# Patient Record
Sex: MALE | Race: WHITE | NOT HISPANIC OR LATINO | Employment: FULL TIME | RURAL
[De-identification: names, ages, dates, MRNs, and addresses within clinical notes are randomized per-mention and may not be internally consistent; named-entity substitution may affect disease eponyms.]

---

## 2021-06-24 ENCOUNTER — OFFICE VISIT (OUTPATIENT)
Dept: NEUROLOGY | Facility: CLINIC | Age: 26
End: 2021-06-24
Payer: COMMERCIAL

## 2021-06-24 VITALS
WEIGHT: 315 LBS | SYSTOLIC BLOOD PRESSURE: 114 MMHG | RESPIRATION RATE: 18 BRPM | HEIGHT: 74 IN | BODY MASS INDEX: 40.43 KG/M2 | OXYGEN SATURATION: 98 % | HEART RATE: 68 BPM | DIASTOLIC BLOOD PRESSURE: 78 MMHG

## 2021-06-24 DIAGNOSIS — F32.A DEPRESSION, UNSPECIFIED DEPRESSION TYPE: ICD-10-CM

## 2021-06-24 DIAGNOSIS — G40.309 GENERALIZED EPILEPSY: Primary | ICD-10-CM

## 2021-06-24 PROCEDURE — 99214 OFFICE O/P EST MOD 30 MIN: CPT | Mod: PBBFAC | Performed by: NURSE PRACTITIONER

## 2021-06-24 PROCEDURE — 3008F PR BODY MASS INDEX (BMI) DOCUMENTED: ICD-10-PCS | Mod: ,,, | Performed by: NURSE PRACTITIONER

## 2021-06-24 PROCEDURE — 99213 PR OFFICE/OUTPT VISIT, EST, LEVL III, 20-29 MIN: ICD-10-PCS | Mod: S$PBB,,, | Performed by: NURSE PRACTITIONER

## 2021-06-24 PROCEDURE — 99213 OFFICE O/P EST LOW 20 MIN: CPT | Mod: S$PBB,,, | Performed by: NURSE PRACTITIONER

## 2021-06-24 PROCEDURE — 3008F BODY MASS INDEX DOCD: CPT | Mod: ,,, | Performed by: NURSE PRACTITIONER

## 2021-06-24 RX ORDER — CITALOPRAM 20 MG/1
20 TABLET, FILM COATED ORAL DAILY
Qty: 30 TABLET | Refills: 11 | Status: SHIPPED | OUTPATIENT
Start: 2021-06-24 | End: 2022-03-08 | Stop reason: SDUPTHER

## 2021-06-24 RX ORDER — TOPIRAMATE 200 MG/1
2 CAPSULE, EXTENDED RELEASE ORAL NIGHTLY
Qty: 30 CAPSULE | Refills: 11 | Status: SHIPPED | OUTPATIENT
Start: 2021-06-24 | End: 2021-09-28

## 2021-09-14 ENCOUNTER — OFFICE VISIT (OUTPATIENT)
Dept: FAMILY MEDICINE | Facility: CLINIC | Age: 26
End: 2021-09-14
Payer: COMMERCIAL

## 2021-09-14 DIAGNOSIS — K52.9 GASTROENTERITIS: ICD-10-CM

## 2021-09-14 DIAGNOSIS — Z11.59 SCREENING FOR VIRAL DISEASE: ICD-10-CM

## 2021-09-14 DIAGNOSIS — G40.909 SEIZURE DISORDER: Chronic | ICD-10-CM

## 2021-09-14 DIAGNOSIS — Z20.822 CLOSE EXPOSURE TO COVID-19 VIRUS: Primary | ICD-10-CM

## 2021-09-14 DIAGNOSIS — J06.9 UPPER RESPIRATORY TRACT INFECTION, UNSPECIFIED TYPE: ICD-10-CM

## 2021-09-14 LAB
CTP QC/QA: YES
FLUAV AG NPH QL: NEGATIVE
FLUBV AG NPH QL: NEGATIVE
SARS-COV-2 AG RESP QL IA.RAPID: NEGATIVE

## 2021-09-14 PROCEDURE — 1159F MED LIST DOCD IN RCRD: CPT | Mod: ,,, | Performed by: FAMILY MEDICINE

## 2021-09-14 PROCEDURE — 87428 SARSCOV & INF VIR A&B AG IA: CPT | Mod: QW,,, | Performed by: FAMILY MEDICINE

## 2021-09-14 PROCEDURE — 99213 PR OFFICE/OUTPT VISIT, EST, LEVL III, 20-29 MIN: ICD-10-PCS | Mod: ,,, | Performed by: FAMILY MEDICINE

## 2021-09-14 PROCEDURE — 87428 POCT SARS-COV2 (COVID) WITH FLU ANTIGEN: ICD-10-PCS | Mod: QW,,, | Performed by: FAMILY MEDICINE

## 2021-09-14 PROCEDURE — 1160F RVW MEDS BY RX/DR IN RCRD: CPT | Mod: ,,, | Performed by: FAMILY MEDICINE

## 2021-09-14 PROCEDURE — 1160F PR REVIEW ALL MEDS BY PRESCRIBER/CLIN PHARMACIST DOCUMENTED: ICD-10-PCS | Mod: ,,, | Performed by: FAMILY MEDICINE

## 2021-09-14 PROCEDURE — 99213 OFFICE O/P EST LOW 20 MIN: CPT | Mod: ,,, | Performed by: FAMILY MEDICINE

## 2021-09-14 PROCEDURE — 1159F PR MEDICATION LIST DOCUMENTED IN MEDICAL RECORD: ICD-10-PCS | Mod: ,,, | Performed by: FAMILY MEDICINE

## 2021-09-15 ENCOUNTER — OFFICE VISIT (OUTPATIENT)
Dept: FAMILY MEDICINE | Facility: CLINIC | Age: 26
End: 2021-09-15
Payer: COMMERCIAL

## 2021-09-15 DIAGNOSIS — R53.81 MALAISE: ICD-10-CM

## 2021-09-15 DIAGNOSIS — Z20.822 CLOSE EXPOSURE TO COVID-19 VIRUS: Primary | ICD-10-CM

## 2021-09-15 DIAGNOSIS — E66.9 OBESITY, UNSPECIFIED CLASSIFICATION, UNSPECIFIED OBESITY TYPE, UNSPECIFIED WHETHER SERIOUS COMORBIDITY PRESENT: Chronic | ICD-10-CM

## 2021-09-15 DIAGNOSIS — Z11.52 ENCOUNTER FOR SCREENING FOR COVID-19: ICD-10-CM

## 2021-09-15 DIAGNOSIS — G40.909 SEIZURE DISORDER: Chronic | ICD-10-CM

## 2021-09-15 PROCEDURE — 99213 PR OFFICE/OUTPT VISIT, EST, LEVL III, 20-29 MIN: ICD-10-PCS | Mod: ,,, | Performed by: FAMILY MEDICINE

## 2021-09-15 PROCEDURE — 1159F MED LIST DOCD IN RCRD: CPT | Mod: ,,, | Performed by: FAMILY MEDICINE

## 2021-09-15 PROCEDURE — 1160F PR REVIEW ALL MEDS BY PRESCRIBER/CLIN PHARMACIST DOCUMENTED: ICD-10-PCS | Mod: ,,, | Performed by: FAMILY MEDICINE

## 2021-09-15 PROCEDURE — 87635 SARS-COV-2 COVID-19 AMP PRB: CPT | Mod: ,,, | Performed by: CLINICAL MEDICAL LABORATORY

## 2021-09-15 PROCEDURE — 1159F PR MEDICATION LIST DOCUMENTED IN MEDICAL RECORD: ICD-10-PCS | Mod: ,,, | Performed by: FAMILY MEDICINE

## 2021-09-15 PROCEDURE — 1160F RVW MEDS BY RX/DR IN RCRD: CPT | Mod: ,,, | Performed by: FAMILY MEDICINE

## 2021-09-15 PROCEDURE — 87635 SARS-COV-2 (COVID-19) QUALITATIVE PCR: ICD-10-PCS | Mod: ,,, | Performed by: CLINICAL MEDICAL LABORATORY

## 2021-09-15 PROCEDURE — 99213 OFFICE O/P EST LOW 20 MIN: CPT | Mod: ,,, | Performed by: FAMILY MEDICINE

## 2021-09-16 ENCOUNTER — TELEPHONE (OUTPATIENT)
Dept: NEUROLOGY | Facility: CLINIC | Age: 26
End: 2021-09-16

## 2021-09-17 LAB — SARS-COV-2 RNA RESP QL NAA+PROBE: NEGATIVE

## 2021-09-28 ENCOUNTER — TELEPHONE (OUTPATIENT)
Dept: NEUROLOGY | Facility: CLINIC | Age: 26
End: 2021-09-28

## 2021-09-28 DIAGNOSIS — G40.309 GENERALIZED EPILEPSY: Primary | ICD-10-CM

## 2021-09-28 RX ORDER — TOPIRAMATE 200 MG/1
200 TABLET ORAL 2 TIMES DAILY
Qty: 60 TABLET | Refills: 11 | Status: SHIPPED | OUTPATIENT
Start: 2021-09-28 | End: 2022-03-08 | Stop reason: SDUPTHER

## 2021-12-20 ENCOUNTER — OFFICE VISIT (OUTPATIENT)
Dept: NEUROLOGY | Facility: CLINIC | Age: 26
End: 2021-12-20
Payer: COMMERCIAL

## 2021-12-20 VITALS
OXYGEN SATURATION: 98 % | WEIGHT: 315 LBS | RESPIRATION RATE: 18 BRPM | HEIGHT: 74 IN | HEART RATE: 85 BPM | SYSTOLIC BLOOD PRESSURE: 112 MMHG | DIASTOLIC BLOOD PRESSURE: 70 MMHG | BODY MASS INDEX: 40.43 KG/M2

## 2021-12-20 DIAGNOSIS — G40.309 GENERALIZED EPILEPSY: Primary | ICD-10-CM

## 2021-12-20 DIAGNOSIS — F32.A DEPRESSION, UNSPECIFIED DEPRESSION TYPE: ICD-10-CM

## 2021-12-20 PROCEDURE — 99213 PR OFFICE/OUTPT VISIT, EST, LEVL III, 20-29 MIN: ICD-10-PCS | Mod: S$PBB,,, | Performed by: NURSE PRACTITIONER

## 2021-12-20 PROCEDURE — 99213 OFFICE O/P EST LOW 20 MIN: CPT | Mod: S$PBB,,, | Performed by: NURSE PRACTITIONER

## 2021-12-20 PROCEDURE — 99214 OFFICE O/P EST MOD 30 MIN: CPT | Mod: PBBFAC | Performed by: NURSE PRACTITIONER

## 2022-03-08 DIAGNOSIS — G40.309 GENERALIZED EPILEPSY: ICD-10-CM

## 2022-03-08 DIAGNOSIS — F32.A DEPRESSION, UNSPECIFIED DEPRESSION TYPE: ICD-10-CM

## 2022-03-08 RX ORDER — CITALOPRAM 20 MG/1
20 TABLET, FILM COATED ORAL DAILY
Qty: 90 TABLET | Refills: 3 | Status: SHIPPED | OUTPATIENT
Start: 2022-03-08 | End: 2022-04-12 | Stop reason: SDUPTHER

## 2022-03-08 RX ORDER — TOPIRAMATE 200 MG/1
200 TABLET ORAL 2 TIMES DAILY
Qty: 180 TABLET | Refills: 3 | Status: SHIPPED | OUTPATIENT
Start: 2022-03-08 | End: 2022-04-12 | Stop reason: SDUPTHER

## 2022-03-08 RX ORDER — ERGOCALCIFEROL 1.25 MG/1
50000 CAPSULE ORAL
Qty: 3 CAPSULE | Refills: 3 | Status: SHIPPED | OUTPATIENT
Start: 2022-03-08 | End: 2022-04-12 | Stop reason: SDUPTHER

## 2022-03-18 ENCOUNTER — PATIENT MESSAGE (OUTPATIENT)
Dept: NEUROLOGY | Facility: CLINIC | Age: 27
End: 2022-03-18
Payer: COMMERCIAL

## 2022-04-07 DIAGNOSIS — G47.33 OSA (OBSTRUCTIVE SLEEP APNEA): Primary | ICD-10-CM

## 2022-04-12 ENCOUNTER — PATIENT MESSAGE (OUTPATIENT)
Dept: NEUROLOGY | Facility: CLINIC | Age: 27
End: 2022-04-12
Payer: COMMERCIAL

## 2022-04-12 DIAGNOSIS — G40.309 GENERALIZED EPILEPSY: ICD-10-CM

## 2022-04-12 DIAGNOSIS — F32.A DEPRESSION, UNSPECIFIED DEPRESSION TYPE: ICD-10-CM

## 2022-04-12 RX ORDER — TOPIRAMATE 200 MG/1
200 TABLET ORAL 2 TIMES DAILY
Qty: 180 TABLET | Refills: 3 | Status: SHIPPED | OUTPATIENT
Start: 2022-04-12 | End: 2022-09-12 | Stop reason: CLARIF

## 2022-04-12 RX ORDER — CITALOPRAM 20 MG/1
20 TABLET, FILM COATED ORAL DAILY
Qty: 90 TABLET | Refills: 3 | Status: SHIPPED | OUTPATIENT
Start: 2022-04-12 | End: 2023-02-02 | Stop reason: SDUPTHER

## 2022-04-12 RX ORDER — ERGOCALCIFEROL 1.25 MG/1
50000 CAPSULE ORAL
Qty: 3 CAPSULE | Refills: 3 | Status: SHIPPED | OUTPATIENT
Start: 2022-04-12

## 2022-04-28 ENCOUNTER — OFFICE VISIT (OUTPATIENT)
Dept: FAMILY MEDICINE | Facility: CLINIC | Age: 27
End: 2022-04-28
Payer: COMMERCIAL

## 2022-04-28 VITALS
HEART RATE: 78 BPM | HEIGHT: 74 IN | SYSTOLIC BLOOD PRESSURE: 125 MMHG | DIASTOLIC BLOOD PRESSURE: 72 MMHG | TEMPERATURE: 97 F | BODY MASS INDEX: 40.43 KG/M2 | WEIGHT: 315 LBS

## 2022-04-28 DIAGNOSIS — R05.3 CHRONIC COUGH: Primary | Chronic | ICD-10-CM

## 2022-04-28 DIAGNOSIS — R21 RASH AND NONSPECIFIC SKIN ERUPTION: Chronic | ICD-10-CM

## 2022-04-28 DIAGNOSIS — J30.1 SEASONAL ALLERGIC RHINITIS DUE TO POLLEN: Chronic | ICD-10-CM

## 2022-04-28 DIAGNOSIS — G47.33 OSA ON CPAP: Chronic | ICD-10-CM

## 2022-04-28 DIAGNOSIS — Z87.09 HISTORY OF ASTHMA: ICD-10-CM

## 2022-04-28 PROCEDURE — 99214 PR OFFICE/OUTPT VISIT, EST, LEVL IV, 30-39 MIN: ICD-10-PCS | Mod: ,,, | Performed by: FAMILY MEDICINE

## 2022-04-28 PROCEDURE — 99214 OFFICE O/P EST MOD 30 MIN: CPT | Mod: ,,, | Performed by: FAMILY MEDICINE

## 2022-04-28 RX ORDER — MONTELUKAST SODIUM 10 MG/1
10 TABLET ORAL NIGHTLY
Qty: 30 TABLET | Refills: 5 | Status: SHIPPED | OUTPATIENT
Start: 2022-04-28 | End: 2022-07-26 | Stop reason: SDUPTHER

## 2022-04-28 RX ORDER — KETOCONAZOLE 20 MG/ML
SHAMPOO, SUSPENSION TOPICAL
Qty: 120 ML | Refills: 1 | Status: SHIPPED | OUTPATIENT
Start: 2022-04-28 | End: 2022-05-05

## 2022-04-28 RX ORDER — TOPIRAMATE 100 MG/1
CAPSULE, EXTENDED RELEASE ORAL
COMMUNITY
End: 2022-05-05

## 2022-04-28 NOTE — PROGRESS NOTES
Jerad Gill MD   [facility]  MEDICAL GROUP [address] [place] [address]  [phone]      PATIENT NAME: Rito Bowens  : 1995  DATE: 22  MRN: [id number]      Billing Provider: Jerad Gill MD  Level of Service:   Patient PCP Information       Provider PCP Type    Jerad Gill MD General            Reason for Visit / Chief Complaint: check up, Cough, and Fatigue (Sores on legs)       Update PCP  Update Chief Complaint         History of Present Illness / Problem Focused Workflow     Rito Bowens presents to the clinic with check up, Cough, and Fatigue (Sores on legs)       27 yo WM who reports chronic cough x almost 10 years.  Has CHRISTINA and is on CPAP.  Says that cough started after he began using CPAP in .  Does report having allergic rhinitis and occasional GERD.  Has h/o childhood asthma.  He reports that cough occurs almost daily and has been worse over the past few weeks. Is non-productive.   Saw sleep clinic 2 weeks ago and told that his CPAP machine has a recall due to problems with filter.  Per pt and his mother, he was told to get a CXR.    Also c/o rash to lower legs present x several years after he had scabies.  Denies any rash on any other areas of body.  It does itch and is not painful.        Review of Systems     Review of Systems   Constitutional: Positive for fatigue. Negative for chills and fever.   HENT: Positive for rhinorrhea and sinus pressure/congestion.    Respiratory: Positive for apnea, cough and wheezing. Negative for shortness of breath.    Integumentary:  Positive for rash.   Neurological: Positive for seizures (last seizure 2019). Negative for syncope.   Psychiatric/Behavioral: Positive for sleep disturbance (on CPAP for CHRISTINA).        Medical / Social / Family History     Past Medical History:   Diagnosis Date    Attention deficit hyperactivity disorder     Epilepsy,  unspecified, intractable, without status epilepticus     Headache     Juvenile myoclonic epilepsy        History reviewed. No pertinent surgical history.    Social History    reports that he has never smoked. He has never used smokeless tobacco. He reports that he does not drink alcohol and does not use drugs.   Social History     Tobacco Use    Smoking status: Never Smoker    Smokeless tobacco: Never Used   Substance Use Topics    Alcohol use: Never    Drug use: Never       Family History  Family History   Problem Relation Age of Onset    Heart disease Other     Hypertension Other     Diabetes Other     Cancer Other         type unknown       Medications and Allergies     Medications  Outpatient Medications Marked as Taking for the 4/28/22 encounter (Office Visit) with Jerad Gill MD   Medication Sig Dispense Refill    citalopram (CELEXA) 20 MG tablet Take 1 tablet (20 mg total) by mouth once daily. 90 tablet 3    ergocalciferol (ERGOCALCIFEROL) 50,000 unit Cap Take 1 capsule (50,000 Units total) by mouth every 30 days. 3 capsule 3    topiramate (TOPAMAX) 200 MG Tab Take 1 tablet (200 mg total) by mouth 2 (two) times daily. 180 tablet 3       Allergies  Review of patient's allergies indicates:  No Known Allergies    Physical Examination     Vitals:    04/28/22 1014   BP: 125/72   Pulse: 78   Temp: 96.9 °F (36.1 °C)     Physical Exam  Vitals reviewed.   Constitutional:       General: He is not in acute distress.     Appearance: Normal appearance. He is obese. He is not ill-appearing or toxic-appearing.   HENT:      Head: Normocephalic and atraumatic.   Eyes:      Extraocular Movements: Extraocular movements intact.      Conjunctiva/sclera: Conjunctivae normal.      Pupils: Pupils are equal, round, and reactive to light.   Cardiovascular:      Rate and Rhythm: Normal rate and regular rhythm.      Heart sounds: Normal heart sounds.   Pulmonary:      Effort: Pulmonary effort is normal. No respiratory  distress.      Breath sounds: Normal breath sounds. No stridor. No wheezing, rhonchi or rales.   Musculoskeletal:         General: Normal range of motion.      Cervical back: Normal range of motion and neck supple.   Skin:     General: Skin is warm and dry.      Findings: Rash (follicular rash to lower legs (non-pustular)) present.   Neurological:      General: No focal deficit present.      Mental Status: He is alert and oriented to person, place, and time.   Psychiatric:         Mood and Affect: Mood normal.         Behavior: Behavior normal.      X-Ray Chest PA And Lateral  Narrative: EXAMINATION:  XR CHEST PA AND LATERAL    CLINICAL HISTORY:  Chronic cough    COMPARISON:  Chest x-ray December 22, 2012    TECHNIQUE:  Frontal and lateral views of the chest.    FINDINGS:  The cardiomediastinal silhouette is stable in configuration.  Chronic change of the lungs without focal consolidation, pleural effusion, or pneumothorax.  Visualized osseous and surrounding soft tissue structures appear grossly unchanged.  Impression: No acute cardiopulmonary process demonstrated.    Point of Service: [facility]    Electronically signed by: Manuel Fuentes  Date:    04/28/2022  Time:    11:42        Assessment and Plan (including Health Maintenance)      Problem List  Smart Sets  Document Outside HM   :    Plan: meds as prescribed        Health Maintenance Due   Topic Date Due    Hepatitis C Screening  Never done    Lipid Panel  Never done    COVID-19 Vaccine (1) Never done    HPV Vaccines (1 - Male 2-dose series) Never done    HIV Screening  Never done    TETANUS VACCINE  Never done    Influenza Vaccine (1) Never done       Problem List Items Addressed This Visit        ENT    Seasonal allergic rhinitis due to pollen    Relevant Medications    montelukast (SINGULAIR) 10 mg tablet       Derm    Rash and nonspecific skin eruption    Relevant Medications    ketoconazole (NIZORAL) 2 % shampoo       Pulmonary     Chronic cough - Primary (Chronic)    Relevant Medications    montelukast (SINGULAIR) 10 mg tablet    Other Relevant Orders    X-Ray Chest PA And Lateral (Completed)       Other    CHRISTINA on CPAP      Other Visit Diagnoses     History of asthma        Relevant Medications    montelukast (SINGULAIR) 10 mg tablet          The patient has no Health Maintenance topics of status Not Due    Future Appointments   Date Time Provider Department Center   5/5/2022  9:15 AM Jerad Gill MD [facility]   6/15/2022  7:00 PM SLEEP LAB, [facility]EP [facility]   6/29/2022  2:00 PM VANESSA Herrera [facility]            Signature:  Jerad Gill MD  [facility]  MEDICAL GROUP [facility] - FAMILY MEDICINE  [address] [place] [address] [place] [address]  [phone]    Date of encounter: 4/28/22

## 2022-05-05 ENCOUNTER — OFFICE VISIT (OUTPATIENT)
Dept: FAMILY MEDICINE | Facility: CLINIC | Age: 27
End: 2022-05-05
Payer: COMMERCIAL

## 2022-05-05 VITALS
SYSTOLIC BLOOD PRESSURE: 116 MMHG | HEIGHT: 74 IN | BODY MASS INDEX: 40.43 KG/M2 | DIASTOLIC BLOOD PRESSURE: 78 MMHG | WEIGHT: 315 LBS | HEART RATE: 72 BPM

## 2022-05-05 DIAGNOSIS — Z13.0 SCREENING FOR IRON DEFICIENCY ANEMIA: ICD-10-CM

## 2022-05-05 DIAGNOSIS — Z00.00 ENCOUNTER FOR ROUTINE HISTORY AND PHYSICAL EXAMINATION OF ADULT: Primary | ICD-10-CM

## 2022-05-05 DIAGNOSIS — Z13.220 SCREENING FOR LIPOID DISORDERS: ICD-10-CM

## 2022-05-05 LAB
ANION GAP SERPL CALCULATED.3IONS-SCNC: 11 MMOL/L (ref 7–16)
ATYPICAL LYMPHOCYTES: ABNORMAL
BASOPHILS # BLD AUTO: 0.07 K/UL (ref 0–0.2)
BASOPHILS NFR BLD AUTO: 0.6 % (ref 0–1)
BUN SERPL-MCNC: 15 MG/DL (ref 7–18)
BUN/CREAT SERPL: 13 (ref 6–20)
CALCIUM SERPL-MCNC: 8.9 MG/DL (ref 8.5–10.1)
CHLORIDE SERPL-SCNC: 105 MMOL/L (ref 98–107)
CHOLEST SERPL-MCNC: 216 MG/DL (ref 0–200)
CHOLEST/HDLC SERPL: 7.2 {RATIO}
CO2 SERPL-SCNC: 25 MMOL/L (ref 21–32)
CREAT SERPL-MCNC: 1.18 MG/DL (ref 0.7–1.3)
DIFFERENTIAL METHOD BLD: ABNORMAL
EOSINOPHIL # BLD AUTO: 0.68 K/UL (ref 0–0.5)
EOSINOPHIL NFR BLD AUTO: 5.5 % (ref 1–4)
EOSINOPHIL NFR BLD MANUAL: 6 % (ref 1–4)
ERYTHROCYTE [DISTWIDTH] IN BLOOD BY AUTOMATED COUNT: 13.5 % (ref 11.5–14.5)
GLUCOSE SERPL-MCNC: 75 MG/DL (ref 74–106)
HCT VFR BLD AUTO: 46.9 % (ref 40–54)
HDLC SERPL-MCNC: 30 MG/DL (ref 40–60)
HGB BLD-MCNC: 15.7 G/DL (ref 13.5–18)
IMM GRANULOCYTES # BLD AUTO: 0.05 K/UL (ref 0–0.04)
IMM GRANULOCYTES NFR BLD: 0.4 % (ref 0–0.4)
LDLC SERPL CALC-MCNC: 146 MG/DL
LDLC/HDLC SERPL: 4.9 {RATIO}
LYMPHOCYTES # BLD AUTO: 4.98 K/UL (ref 1–4.8)
LYMPHOCYTES NFR BLD AUTO: 40 % (ref 27–41)
LYMPHOCYTES NFR BLD MANUAL: 43 % (ref 27–41)
MCH RBC QN AUTO: 27.4 PG (ref 27–31)
MCHC RBC AUTO-ENTMCNC: 33.5 G/DL (ref 32–36)
MCV RBC AUTO: 81.8 FL (ref 80–96)
MONOCYTES # BLD AUTO: 0.71 K/UL (ref 0–0.8)
MONOCYTES NFR BLD AUTO: 5.7 % (ref 2–6)
MONOCYTES NFR BLD MANUAL: 7 % (ref 2–6)
MPC BLD CALC-MCNC: 10.6 FL (ref 9.4–12.4)
NEUTROPHILS # BLD AUTO: 5.96 K/UL (ref 1.8–7.7)
NEUTROPHILS NFR BLD AUTO: 47.8 % (ref 53–65)
NEUTS SEG NFR BLD MANUAL: 44 % (ref 50–62)
NONHDLC SERPL-MCNC: 186 MG/DL
NRBC # BLD AUTO: 0 X10E3/UL
NRBC, AUTO (.00): 0 %
PLATELET # BLD AUTO: 329 K/UL (ref 150–400)
PLATELET MORPHOLOGY: NORMAL
POTASSIUM SERPL-SCNC: 4.3 MMOL/L (ref 3.5–5.1)
RBC # BLD AUTO: 5.73 M/UL (ref 4.6–6.2)
RBC MORPH BLD: NORMAL
SODIUM SERPL-SCNC: 137 MMOL/L (ref 136–145)
TRIGL SERPL-MCNC: 199 MG/DL (ref 35–150)
VLDLC SERPL-MCNC: 40 MG/DL
WBC # BLD AUTO: 12.45 K/UL (ref 4.5–11)

## 2022-05-05 PROCEDURE — 99395 PR PREVENTIVE VISIT,EST,18-39: ICD-10-PCS | Mod: ,,, | Performed by: FAMILY MEDICINE

## 2022-05-05 PROCEDURE — 80061 LIPID PANEL: CPT | Mod: ,,, | Performed by: CLINICAL MEDICAL LABORATORY

## 2022-05-05 PROCEDURE — 85025 COMPLETE CBC W/AUTO DIFF WBC: CPT | Mod: ,,, | Performed by: CLINICAL MEDICAL LABORATORY

## 2022-05-05 PROCEDURE — 80061 LIPID PANEL: ICD-10-PCS | Mod: ,,, | Performed by: CLINICAL MEDICAL LABORATORY

## 2022-05-05 PROCEDURE — 99395 PREV VISIT EST AGE 18-39: CPT | Mod: ,,, | Performed by: FAMILY MEDICINE

## 2022-05-05 PROCEDURE — 80048 BASIC METABOLIC PANEL: ICD-10-PCS | Mod: ,,, | Performed by: CLINICAL MEDICAL LABORATORY

## 2022-05-05 PROCEDURE — 85025 CBC WITH DIFFERENTIAL: ICD-10-PCS | Mod: ,,, | Performed by: CLINICAL MEDICAL LABORATORY

## 2022-05-05 PROCEDURE — 80048 BASIC METABOLIC PNL TOTAL CA: CPT | Mod: ,,, | Performed by: CLINICAL MEDICAL LABORATORY

## 2022-05-05 NOTE — PROGRESS NOTES
Jerad Gill MD   [facility]  MEDICAL GROUP [address] [address]  [phone]      PATIENT NAME: Riot Bowens  : 1995  DATE: 22  MRN: [id number]      Billing Provider: Jerad Gill MD  Level of Service: NE PREVENTIVE VISIT,EST,18-39  Patient PCP Information       Provider PCP Type    Jerad Gill MD General            Reason for Visit / Chief Complaint: Annual Exam       Update PCP  Update Chief Complaint         History of Present Illness / Problem Focused Workflow     Rito Bowens presents to the clinic with Annual Exam       27 yo WM who presents for wellness visit.  He does not remember the last time that he had labs done.  PMH is significant childhood asthma and seizure disorder.   Last seizure was in 2019.  He also has CHRISTINA and is on CPAP. He does not smoke but does use smokeless tobacco.  He wears glasses and states that he is due for an eye exam.  He is overdue for a dental exam.       Review of Systems     Review of Systems   Constitutional: Negative for chills, fatigue and fever.   HENT: Negative for sore throat and trouble swallowing.    Respiratory: Negative for shortness of breath and wheezing.    Cardiovascular: Negative for chest pain, palpitations and leg swelling.   Gastrointestinal: Negative for abdominal pain, change in bowel habit, nausea, vomiting and change in bowel habit.   Musculoskeletal: Negative for arthralgias, back pain, joint swelling and myalgias.   Integumentary:  Negative for rash and mole/lesion.   Neurological: Negative for dizziness, syncope, weakness, light-headedness, numbness and headaches.   Psychiatric/Behavioral: Negative for confusion.        Medical / Social / Family History     Past Medical History:   Diagnosis Date    Attention deficit hyperactivity disorder     Epilepsy, unspecified, intractable, without status epilepticus     Headache     Juvenile  myoclonic epilepsy        History reviewed. No pertinent surgical history.    Social History    reports that he has never smoked. He has never used smokeless tobacco. He reports that he does not drink alcohol and does not use drugs.   Social History     Tobacco Use    Smoking status: Never Smoker    Smokeless tobacco: Never Used   Substance Use Topics    Alcohol use: Never    Drug use: Never       Family History  Family History   Problem Relation Age of Onset    Heart disease Other     Hypertension Other     Diabetes Other     Cancer Other         type unknown       Medications and Allergies     Medications  Outpatient Medications Marked as Taking for the 5/5/22 encounter (Office Visit) with Jerad Gill MD   Medication Sig Dispense Refill    citalopram (CELEXA) 20 MG tablet Take 1 tablet (20 mg total) by mouth once daily. 90 tablet 3    ergocalciferol (ERGOCALCIFEROL) 50,000 unit Cap Take 1 capsule (50,000 Units total) by mouth every 30 days. 3 capsule 3    montelukast (SINGULAIR) 10 mg tablet Take 1 tablet (10 mg total) by mouth every evening. 30 tablet 5    topiramate (TOPAMAX) 200 MG Tab Take 1 tablet (200 mg total) by mouth 2 (two) times daily. 180 tablet 3       Allergies  Review of patient's allergies indicates:  No Known Allergies    Physical Examination     Vitals:    05/05/22 0930   BP: 116/78   Pulse: 72     Physical Exam  Vitals reviewed.   Constitutional:       Appearance: Normal appearance.   HENT:      Head: Normocephalic and atraumatic.   Eyes:      Extraocular Movements: Extraocular movements intact.      Conjunctiva/sclera: Conjunctivae normal.      Pupils: Pupils are equal, round, and reactive to light.   Cardiovascular:      Rate and Rhythm: Normal rate and regular rhythm.      Heart sounds: Normal heart sounds.   Pulmonary:      Effort: Pulmonary effort is normal.      Breath sounds: Normal breath sounds.   Musculoskeletal:         General: Normal range of motion.      Cervical  back: Normal range of motion and neck supple.   Skin:     General: Skin is warm and dry.   Neurological:      General: No focal deficit present.      Mental Status: He is alert and oriented to person, place, and time.   Psychiatric:         Mood and Affect: Mood normal.         Behavior: Behavior normal.          Assessment and Plan (including Health Maintenance)      Problem List  Smart Sets  Document Outside HM   :    Plan: recommend getting eye exam and dental exam.    Advised to stop oral tobacco and discussed risks of use, including oral cancer.    Recommend healthy diet low in sodium, saturated/trans fats, refined carbs and processed foods.    Recommend regular exercise.    Stay UTD on preventive screenings.  Pt has patient protal account to review lab results.  Advised to call clinic for any abnormal results.          Health Maintenance Due   Topic Date Due    Hepatitis C Screening  Never done    Lipid Panel  Never done    COVID-19 Vaccine (1) Never done    HPV Vaccines (1 - Male 2-dose series) Never done    HIV Screening  Never done    TETANUS VACCINE  Never done       Problem List Items Addressed This Visit    None     Visit Diagnoses     Encounter for routine history and physical examination of adult    -  Primary    Relevant Orders    Basic Metabolic Panel    Screening for iron deficiency anemia        Relevant Orders    CBC Auto Differential    Screening for lipoid disorders        Relevant Orders    Lipid Panel          Health Maintenance Topics with due status: Not Due       Topic Last Completion Date    Influenza Vaccine Not Due       Future Appointments   Date Time Provider Department Center   6/15/2022  7:00 PM SLEEP LAB, [facility]   6/29/2022  2:00 PM VANESSA Herrera [facility]            Signature:  MD [facility]  MEDICAL GROUP [facility] - FAMILY MEDICINE  [address] [place] [address] [place]  [address]  [phone]    Date of encounter: 5/5/22

## 2022-06-01 ENCOUNTER — HOSPITAL ENCOUNTER (EMERGENCY)
Facility: HOSPITAL | Age: 27
Discharge: HOME | End: 2022-06-01
Payer: COMMERCIAL

## 2022-06-01 VITALS
SYSTOLIC BLOOD PRESSURE: 119 MMHG | WEIGHT: 315 LBS | OXYGEN SATURATION: 99 % | RESPIRATION RATE: 16 BRPM | HEART RATE: 88 BPM | TEMPERATURE: 98 F | DIASTOLIC BLOOD PRESSURE: 62 MMHG | BODY MASS INDEX: 39.17 KG/M2 | HEIGHT: 75 IN

## 2022-06-01 DIAGNOSIS — N20.0 KIDNEY STONE: Primary | ICD-10-CM

## 2022-06-01 LAB
ALBUMIN SERPL BCP-MCNC: 4.4 G/DL (ref 3.5–5)
ALBUMIN/GLOB SERPL: 1 {RATIO}
ALP SERPL-CCNC: 118 U/L (ref 45–115)
ALT SERPL W P-5'-P-CCNC: 74 U/L (ref 16–61)
AMORPH PHOS CRY #/AREA URNS LPF: ABNORMAL /LPF
ANION GAP SERPL CALCULATED.3IONS-SCNC: 15 MMOL/L (ref 7–16)
AST SERPL W P-5'-P-CCNC: 30 U/L (ref 15–37)
BASOPHILS # BLD AUTO: 0.04 K/UL (ref 0–0.2)
BASOPHILS NFR BLD AUTO: 0.3 % (ref 0–1)
BILIRUB SERPL-MCNC: 0.4 MG/DL (ref 0–1.2)
BILIRUB UR QL STRIP: ABNORMAL
BUN SERPL-MCNC: 18 MG/DL (ref 7–18)
BUN/CREAT SERPL: 13 (ref 6–20)
CALCIUM SERPL-MCNC: 9 MG/DL (ref 8.5–10.1)
CHLORIDE SERPL-SCNC: 103 MMOL/L (ref 98–107)
CLARITY UR: ABNORMAL
CO2 SERPL-SCNC: 22 MMOL/L (ref 21–32)
COLOR UR: YELLOW
CREAT SERPL-MCNC: 1.37 MG/DL (ref 0.7–1.3)
DIFFERENTIAL METHOD BLD: ABNORMAL
EOSINOPHIL # BLD AUTO: 0.12 K/UL (ref 0–0.5)
EOSINOPHIL NFR BLD AUTO: 0.9 % (ref 1–4)
EOSINOPHIL NFR BLD MANUAL: 1 % (ref 1–4)
ERYTHROCYTE [DISTWIDTH] IN BLOOD BY AUTOMATED COUNT: 14 % (ref 11.5–14.5)
GLOBULIN SER-MCNC: 4.2 G/DL (ref 2–4)
GLUCOSE SERPL-MCNC: 134 MG/DL (ref 74–106)
GLUCOSE UR STRIP-MCNC: NEGATIVE MG/DL
HCT VFR BLD AUTO: 45.6 % (ref 40–54)
HGB BLD-MCNC: 15.6 G/DL (ref 13.5–18)
KETONES UR STRIP-SCNC: NEGATIVE MG/DL
LEUKOCYTE ESTERASE UR QL STRIP: NEGATIVE
LIPASE SERPL-CCNC: 114 U/L (ref 73–393)
LYMPHOCYTES # BLD AUTO: 3.29 K/UL (ref 1–4.8)
LYMPHOCYTES NFR BLD AUTO: 25.2 % (ref 27–41)
LYMPHOCYTES NFR BLD MANUAL: 20 % (ref 27–41)
MCH RBC QN AUTO: 27.2 PG (ref 27–31)
MCHC RBC AUTO-ENTMCNC: 34.2 G/DL (ref 32–36)
MCV RBC AUTO: 79.6 FL (ref 80–96)
MONOCYTES # BLD AUTO: 0.81 K/UL (ref 0–0.8)
MONOCYTES NFR BLD AUTO: 6.2 % (ref 2–6)
MONOCYTES NFR BLD MANUAL: 9 % (ref 2–6)
MPC BLD CALC-MCNC: 10.6 FL (ref 9.4–12.4)
MUCOUS THREADS #/AREA URNS HPF: ABNORMAL /HPF
NEUTROPHILS # BLD AUTO: 8.79 K/UL (ref 1.8–7.7)
NEUTROPHILS NFR BLD AUTO: 67.4 % (ref 53–65)
NEUTS SEG NFR BLD MANUAL: 70 % (ref 50–62)
NITRITE UR QL STRIP: NEGATIVE
NRBC BLD MANUAL-RTO: ABNORMAL %
PH UR STRIP: 7 PH UNITS
PLATELET # BLD AUTO: 268 K/UL (ref 150–400)
PLATELET MORPHOLOGY: NORMAL
POTASSIUM SERPL-SCNC: 3.4 MMOL/L (ref 3.5–5.1)
PROT SERPL-MCNC: 8.6 G/DL (ref 6.4–8.2)
PROT UR QL STRIP: 30
RBC # BLD AUTO: 5.73 M/UL (ref 4.6–6.2)
RBC # UR STRIP: ABNORMAL /UL
RBC #/AREA URNS HPF: ABNORMAL /HPF
RBC MORPH BLD: NORMAL
SODIUM SERPL-SCNC: 137 MMOL/L (ref 136–145)
SP GR UR STRIP: 1.02
SQUAMOUS #/AREA URNS LPF: ABNORMAL /LPF
UROBILINOGEN UR STRIP-ACNC: 1 MG/DL
WBC # BLD AUTO: 13.05 K/UL (ref 4.5–11)
WBC #/AREA URNS HPF: ABNORMAL /HPF

## 2022-06-01 PROCEDURE — 80053 COMPREHEN METABOLIC PANEL: CPT | Performed by: NURSE PRACTITIONER

## 2022-06-01 PROCEDURE — 85025 COMPLETE CBC W/AUTO DIFF WBC: CPT | Performed by: NURSE PRACTITIONER

## 2022-06-01 PROCEDURE — 36415 COLL VENOUS BLD VENIPUNCTURE: CPT | Performed by: NURSE PRACTITIONER

## 2022-06-01 PROCEDURE — 83690 ASSAY OF LIPASE: CPT | Performed by: NURSE PRACTITIONER

## 2022-06-01 PROCEDURE — 99284 PR EMERGENCY DEPT VISIT,LEVEL IV: ICD-10-PCS | Mod: ,,, | Performed by: NURSE PRACTITIONER

## 2022-06-01 PROCEDURE — 99284 EMERGENCY DEPT VISIT MOD MDM: CPT | Mod: ,,, | Performed by: NURSE PRACTITIONER

## 2022-06-01 PROCEDURE — 25000003 PHARM REV CODE 250: Performed by: NURSE PRACTITIONER

## 2022-06-01 PROCEDURE — 99285 EMERGENCY DEPT VISIT HI MDM: CPT | Mod: 25 | Performed by: NURSE PRACTITIONER

## 2022-06-01 PROCEDURE — 96361 HYDRATE IV INFUSION ADD-ON: CPT | Performed by: NURSE PRACTITIONER

## 2022-06-01 PROCEDURE — 81001 URINALYSIS AUTO W/SCOPE: CPT | Performed by: NURSE PRACTITIONER

## 2022-06-01 PROCEDURE — 96374 THER/PROPH/DIAG INJ IV PUSH: CPT | Performed by: NURSE PRACTITIONER

## 2022-06-01 PROCEDURE — 63600175 PHARM REV CODE 636 W HCPCS: Performed by: NURSE PRACTITIONER

## 2022-06-01 RX ORDER — KETOROLAC TROMETHAMINE 30 MG/ML
15 INJECTION, SOLUTION INTRAMUSCULAR; INTRAVENOUS
Status: COMPLETED | OUTPATIENT
Start: 2022-06-01 | End: 2022-06-01

## 2022-06-01 RX ADMIN — SODIUM CHLORIDE 1000 ML: 9 INJECTION, SOLUTION INTRAVENOUS at 07:06

## 2022-06-01 RX ADMIN — KETOROLAC TROMETHAMINE 15 MG: 30 INJECTION, SOLUTION INTRAMUSCULAR at 07:06

## 2022-06-01 NOTE — ED PROVIDER NOTES
Encounter Date: 6/1/2022       History     Chief Complaint   Patient presents with    Flank Pain     Left flank pain that began approx 0400 this AM.      Patient presents to the ED with complaints of left flank pain that started approx 3 hours PTA. Patient reports he feels like he needs to have a BM and he needs to urinate but feels like he can't go. Patient reports normal BM yesterday, denies history of kidney stones, denies fever and vomiting. Patient reports some nausea.         Review of patient's allergies indicates:   Allergen Reactions    Amoxicillin Other (See Comments)     Past Medical History:   Diagnosis Date    Attention deficit hyperactivity disorder     Epilepsy, unspecified, intractable, without status epilepticus     Headache     Juvenile myoclonic epilepsy      History reviewed. No pertinent surgical history.  Family History   Problem Relation Age of Onset    Heart disease Other     Hypertension Other     Diabetes Other     Cancer Other         type unknown     Social History     Tobacco Use    Smoking status: Never Smoker    Smokeless tobacco: Never Used   Substance Use Topics    Alcohol use: Never    Drug use: Never     Review of Systems   Constitutional: Negative.    Respiratory: Negative.    Cardiovascular: Negative.    Gastrointestinal: Positive for nausea. Negative for constipation, diarrhea and vomiting.   Genitourinary: Positive for difficulty urinating and flank pain.   Skin: Negative.    Neurological: Negative.    Psychiatric/Behavioral: Negative.    All other systems reviewed and are negative.      Physical Exam     Initial Vitals [06/01/22 0626]   BP Pulse Resp Temp SpO2   129/75 96 20 97.7 °F (36.5 °C) 99 %      MAP       --         Physical Exam    Vitals reviewed.  Constitutional: He appears well-developed and well-nourished.   HENT:   Head: Normocephalic.   Neck: Neck supple.   Normal range of motion.  Cardiovascular: Normal rate, regular rhythm, normal heart sounds and  intact distal pulses.   Pulmonary/Chest: Breath sounds normal.   Abdominal: Abdomen is soft. Bowel sounds are normal.   Musculoskeletal:         General: Normal range of motion.      Cervical back: Normal range of motion and neck supple.     Neurological: He is alert and oriented to person, place, and time. He has normal strength. GCS score is 15. GCS eye subscore is 4. GCS verbal subscore is 5. GCS motor subscore is 6.   Skin: Skin is warm and dry. Capillary refill takes less than 2 seconds.   Psychiatric: He has a normal mood and affect. His behavior is normal. Judgment and thought content normal.         Medical Screening Exam   See Full Note    ED Course   Procedures  Labs Reviewed   COMPREHENSIVE METABOLIC PANEL - Abnormal; Notable for the following components:       Result Value    Potassium 3.4 (*)     Glucose 134 (*)     Creatinine 1.37 (*)     Total Protein 8.6 (*)     Globulin 4.2 (*)     Alk Phos 118 (*)     ALT 74 (*)     All other components within normal limits   URINALYSIS, REFLEX TO URINE CULTURE - Abnormal; Notable for the following components:    Clarity, UA Cloudy (*)     Protein, UA 30  (*)     Bilirubin, UA Small (*)     Blood, UA Trace-Intact (*)     All other components within normal limits   CBC WITH DIFFERENTIAL - Abnormal; Notable for the following components:    WBC 13.05 (*)     MCV 79.6 (*)     Neutrophils % 67.4 (*)     Lymphocytes % 25.2 (*)     Neutrophils, Abs 8.79 (*)     Monocytes % 6.2 (*)     Eosinophils % 0.9 (*)     Monocytes, Absolute 0.81 (*)     All other components within normal limits   MANUAL DIFFERENTIAL - Abnormal; Notable for the following components:    Segmented Neutrophils, Man % 70 (*)     Lymphocytes, Man % 20 (*)     Monocytes, Man % 9 (*)     All other components within normal limits   URINALYSIS, MICROSCOPIC - Abnormal; Notable for the following components:    RBC, UA 3-5 (*)     Mucus, UA Few (*)     Amorphous Crystals, UA Few (*)     All other components  within normal limits   LIPASE - Normal   CBC W/ AUTO DIFFERENTIAL    Narrative:     The following orders were created for panel order CBC W/ AUTO DIFFERENTIAL.  Procedure                               Abnormality         Status                     ---------                               -----------         ------                     CBC with Differential[[id number]]        Abnormal            Final result               Manual Differential[[id number]]          Abnormal            Final result                 Please view results for these tests on the individual orders.          Imaging Results          CT Renal Stone Study ABD Pelvis WO (Final result)  Result time 06/01/22 08:24:42    Final result by Manuel Fuentes DO (06/01/22 08:24:42)                 Impression:      Mild left-sided hydronephrosis without convincing ureteral calculus. Findings may reflect recently passed stone.  Correlate with urinalysis. There are a few 1-2 mm nonobstructing bilateral renal calculi.  Hepatic steatosis.  Chronic appearing superior endplate deformities of T10 and T11.    The CT exam was performed using one or more of the following dose    reduction techniques- Automated exposure control, adjustment of the mA    and/or kV according to patient size, and/or use of iterative    reconstructed technique.    Point of Service: [facility]      Electronically signed by: Manuel Fuentes  Date:    06/01/2022  Time:    08:24             Narrative:    EXAMINATION:  CT RENAL STONE STUDY ABD PELVIS WO    CLINICAL HISTORY:  Flank pain, kidney stone suspected;    COMPARISON:  None    TECHNIQUE:  Multiple axial tomographic images of the abdomen and pelvis were obtained without the use of intravenous contrast.    FINDINGS:  Mild dependent changes of the lungs noted.    Hypoattenuation of the liver consistent with steatosis.  Visualized gallbladder grossly unremarkable.  Visualized pancreas appears unremarkable.  Spleen grossly  unremarkable.    Bilateral adrenal glands grossly unremarkable.  Mild left-sided hydronephrosis without convincing ureteral calculus.  Findings may reflect recently passed stone.  Correlate with urinalysis.  There are a few 1-2 mm nonobstructing bilateral renal calculi.  Urinary bladder incompletely distended.  Prostate and seminal vesicles grossly unremarkable.    No convincing evidence of gastrointestinal obstruction or acute appendicitis.  Mild atherosclerotic calcifications demonstrated.  Visualized osseous and surrounding soft tissue structures demonstrate no acute abnormality.                                 Medications   ketorolac injection 15 mg (15 mg Intravenous Given 6/1/22 2558)   sodium chloride 0.9% bolus 1,000 mL (1,000 mLs Intravenous New Bag 6/1/22 7472)     Medical Decision Making:   ED Management:  Patient has trace amount of blood in urine, will order CT renal stone to check for kidney stone  0845 - CT shows recently passed stone with mild hydronephrosis. Patient reports his pain is completely gone. Will let patient finish fluid bolus.                    Clinical Impression:   Final diagnoses:  [N20.0] Kidney stone (Primary)          ED Disposition Condition    Discharge Stable        ED Prescriptions     None        Follow-up Information     Follow up With Specialties Details Why Contact Info    primary care provider  In 1 week As needed            VANESSA Ly  06/01/22 1250

## 2022-06-15 ENCOUNTER — PROCEDURE VISIT (OUTPATIENT)
Dept: SLEEP MEDICINE | Facility: HOSPITAL | Age: 27
End: 2022-06-15
Payer: COMMERCIAL

## 2022-06-15 DIAGNOSIS — G47.33 OSA (OBSTRUCTIVE SLEEP APNEA): ICD-10-CM

## 2022-06-15 PROCEDURE — 95811 POLYSOM 6/>YRS CPAP 4/> PARM: CPT | Mod: PO

## 2022-06-16 NOTE — PROCEDURES
Procedures   CPAP Sleep summary included in technical report. It has been uploaded to [employer].

## 2022-06-29 ENCOUNTER — OFFICE VISIT (OUTPATIENT)
Dept: NEUROLOGY | Facility: CLINIC | Age: 27
End: 2022-06-29
Payer: COMMERCIAL

## 2022-06-29 VITALS
BODY MASS INDEX: 39.17 KG/M2 | SYSTOLIC BLOOD PRESSURE: 112 MMHG | RESPIRATION RATE: 18 BRPM | DIASTOLIC BLOOD PRESSURE: 78 MMHG | OXYGEN SATURATION: 98 % | HEART RATE: 71 BPM | WEIGHT: 315 LBS | HEIGHT: 75 IN

## 2022-06-29 DIAGNOSIS — F32.A DEPRESSION, UNSPECIFIED DEPRESSION TYPE: ICD-10-CM

## 2022-06-29 DIAGNOSIS — G40.309 GENERALIZED EPILEPSY: Primary | ICD-10-CM

## 2022-06-29 PROCEDURE — 99213 PR OFFICE/OUTPT VISIT, EST, LEVL III, 20-29 MIN: ICD-10-PCS | Mod: S$PBB,,, | Performed by: NURSE PRACTITIONER

## 2022-06-29 PROCEDURE — 99214 OFFICE O/P EST MOD 30 MIN: CPT | Mod: PBBFAC | Performed by: NURSE PRACTITIONER

## 2022-06-29 PROCEDURE — 99213 OFFICE O/P EST LOW 20 MIN: CPT | Mod: S$PBB,,, | Performed by: NURSE PRACTITIONER

## 2022-06-29 RX ORDER — LEVETIRACETAM 500 MG/1
TABLET ORAL
Qty: 60 TABLET | Refills: 11 | Status: SHIPPED | OUTPATIENT
Start: 2022-06-29 | End: 2022-09-12

## 2022-06-29 NOTE — PROGRESS NOTES
Subjective:       Patient ID: Rito Bowens is a 26 y.o. male     Chief Complaint:    Chief Complaint   Patient presents with    Follow-up    Seizures        Allergies:  Amoxicillin    Current Medications:    Outpatient Encounter Medications as of 6/29/2022   Medication Sig Dispense Refill    citalopram (CELEXA) 20 MG tablet Take 1 tablet (20 mg total) by mouth once daily. 90 tablet 3    ergocalciferol (ERGOCALCIFEROL) 50,000 unit Cap Take 1 capsule (50,000 Units total) by mouth every 30 days. 3 capsule 3    montelukast (SINGULAIR) 10 mg tablet Take 1 tablet (10 mg total) by mouth every evening. 30 tablet 5    topiramate (TOPAMAX) 200 MG Tab Take 1 tablet (200 mg total) by mouth 2 (two) times daily. 180 tablet 3    levETIRAcetam (KEPPRA) 500 MG Tab Take 1/2 tablet twice daily for a week than increase to 1 tablet twice daily after that 60 tablet 11     No facility-administered encounter medications on file as of 6/29/2022.       History of Present Illness  27 y/o WM here today for f/u for primary generalized epilepsy.      Last reported seizure was in July 2019, at that time was on amoxicillin and his Trokendi XR dosage had been decreased.  However in September 2021 his insurance would no longer cover the Trokendi thus we had to change him to IR topamax 200mg BID.  He denies any complications with the medication, no seizures and no side effects.    I do see however that he was treated in the ER earlier this month for kidney stone.  I have discussed with patient about increased risk of kidney stone with topamax.  Patient has been discussed about other treatment options, including keppra.  Patient is in agreement to transition to keppra, will plan to start him on lower dosing and taper him up.    No new neurological symptoms reported    Originally he was treated with depakote but continued with seizures and was changed to topamax and ultimately trokendi XR due to compliance issues and has been on it  ever since.         Review of Systems  Review of Systems   Constitutional: Negative for diaphoresis and fever.   HENT: Negative for congestion, hearing loss and tinnitus.    Eyes: Negative for blurred vision, double vision, photophobia, discharge and redness.   Respiratory: Negative for cough and shortness of breath.    Cardiovascular: Negative for chest pain.   Gastrointestinal: Negative for abdominal pain, nausea and vomiting.   Musculoskeletal: Negative for back pain, joint pain, myalgias and neck pain.   Skin: Negative for itching and rash.   Neurological: Negative for dizziness, tremors, sensory change, speech change, focal weakness, seizures, loss of consciousness, weakness and headaches.   Psychiatric/Behavioral: Negative for depression, hallucinations and memory loss. The patient does not have insomnia.    All other systems reviewed and are negative.     Objective:     NEUROLOGICAL EXAMINATION:     MENTAL STATUS   Oriented to person, place, and time.   Registration: recalls 3 of 3 objects. Recall at 5 minutes: recalls 3 of 3 objects.   Attention: normal. Concentration: normal.   Speech: speech is normal   Level of consciousness: alert  Knowledge: good and consistent with education.   Normal comprehension.     CRANIAL NERVES     CN II   Visual fields full to confrontation.   Visual acuity: normal  Right visual field deficit: none  Left visual field deficit: none     CN III, IV, VI   Pupils are equal, round, and reactive to light.  Extraocular motions are normal.   Right pupil: Size: 3 mm. Shape: regular. Reactivity: brisk. Consensual response: intact. Accommodation: intact.   Left pupil: Size: 3 mm. Shape: regular. Reactivity: brisk. Consensual response: intact. Accommodation: intact.   CN III: no CN III palsy  CN VI: no CN VI palsy  Nystagmus: none   Diplopia: none  Upgaze: normal  Downgaze: normal  Conjugate gaze: present  Vestibulo-ocular reflex: present    CN V   Facial sensation intact.   Right facial  sensation deficit: none  Left facial sensation deficit: none  Right corneal reflex: normal  Left corneal reflex: normal    CN VII   Facial expression full, symmetric.   Right facial weakness: none  Left facial weakness: none  Right taste: normal  Left taste: normal    CN VIII   CN VIII normal.   Hearing: intact    CN IX, X   CN IX normal.   CN X normal.   Palate: symmetric    CN XI   CN XI normal.   Right sternocleidomastoid strength: normal  Left sternocleidomastoid strength: normal  Right trapezius strength: normal  Left trapezius strength: normal    CN XII   CN XII normal.   Tongue: not atrophic  Fasciculations: absent  Tongue deviation: none    MOTOR EXAM   Muscle bulk: normal  Overall muscle tone: normal  Right arm tone: normal  Left arm tone: normal  Right arm pronator drift: absent  Left arm pronator drift: absent  Right leg tone: normal  Left leg tone: normal    Strength   Right neck flexion: 5/5  Left neck flexion: 5/5  Right neck extension: 5/5  Left neck extension: 5/5  Right deltoid: 5/5  Left deltoid: 5/5  Right biceps: 5/5  Left biceps: 5/5  Right triceps: 5/5  Left triceps: 5/5  Right wrist flexion: 5/5  Left wrist flexion: 5/5  Right wrist extension: 5/5  Left wrist extension: 5/5  Right interossei: 5/5  Left interossei: 5/5  Right iliopsoas: 5/5  Left iliopsoas: 5/5  Right quadriceps: 5/5  Left quadriceps: 5/5  Right hamstrin/5  Left hamstrin/5  Right anterior tibial: 5/5  Left anterior tibial: 5/5  Right posterior tibial: 5/5  Left posterior tibial: 5/5  Right gastroc: 5/5  Left gastroc: 5/5    REFLEXES     Reflexes   Right brachioradialis: 2+  Left brachioradialis: 2+  Right biceps: 2+  Left biceps: 2+  Right triceps: 2+  Left triceps: 2+  Right patellar: 2+  Left patellar: 2+  Right achilles: 2+  Left achilles: 2+  Right plantar: normal  Left plantar: normal  Right Stark: absent  Left Stark: absent  Right ankle clonus: absent  Left ankle clonus: absent  Right pendular knee jerk:  absent  Left pendular knee jerk: absent    SENSORY EXAM   Light touch normal.   Right arm light touch: normal  Left arm light touch: normal  Right leg light touch: normal  Left leg light touch: normal  Vibration normal.   Right arm vibration: normal  Left arm vibration: normal  Right leg vibration: normal  Left leg vibration: normal  Proprioception normal.   Right arm proprioception: normal  Left arm proprioception: normal  Right leg proprioception: normal  Left leg proprioception: normal  Pinprick normal.   Right arm pinprick: normal  Left arm pinprick: normal  Right leg pinprick: normal  Left leg pinprick: normal  Graphesthesia: normal  Romberg: negative  Stereognosis: normal    GAIT AND COORDINATION     Gait  Gait: normal     Coordination   Finger to nose coordination: normal  Heel to shin coordination: normal  Tandem walking coordination: normal    Tremor   Resting tremor: absent  Intention tremor: absent  Action tremor: absent       Physical Exam  Vitals and nursing note reviewed.   Constitutional:       Appearance: Normal appearance.   HENT:      Head: Normocephalic.   Eyes:      Extraocular Movements: EOM normal.      Pupils: Pupils are equal, round, and reactive to light.   Cardiovascular:      Rate and Rhythm: Normal rate and regular rhythm.      Pulses: Normal pulses.      Heart sounds: Normal heart sounds.   Pulmonary:      Effort: Pulmonary effort is normal.      Breath sounds: Normal breath sounds.   Musculoskeletal:         General: Normal range of motion.      Cervical back: Normal range of motion and neck supple.   Skin:     General: Skin is warm and dry.   Neurological:      General: No focal deficit present.      Mental Status: He is alert and oriented to person, place, and time.      Cranial Nerves: No cranial nerve deficit.      Sensory: No sensory deficit.      Motor: No weakness.      Coordination: Coordination normal. Finger-Nose-Finger Test, Heel to Shin Test and Romberg Test normal.       Gait: Gait is intact. Gait and tandem walk normal.      Deep Tendon Reflexes: Reflexes normal.      Reflex Scores:       Tricep reflexes are 2+ on the right side and 2+ on the left side.       Bicep reflexes are 2+ on the right side and 2+ on the left side.       Brachioradialis reflexes are 2+ on the right side and 2+ on the left side.       Patellar reflexes are 2+ on the right side and 2+ on the left side.       Achilles reflexes are 2+ on the right side and 2+ on the left side.  Psychiatric:         Mood and Affect: Mood normal.         Speech: Speech normal.         Behavior: Behavior normal.          Assessment:     Problem List Items Addressed This Visit        Neuro    Generalized epilepsy - Primary    Relevant Medications    levETIRAcetam (KEPPRA) 500 MG Tab           Primary Diagnosis and ICD10  Generalized epilepsy [G40.309]    Plan:     Patient Instructions   1. Continue topamax 200mg twice daily for now  2. Start keppra 1/2 tablet twice daily for a week then take 1 tablet twice daily after that      There are no discontinued medications.    Requested Prescriptions     Signed Prescriptions Disp Refills    levETIRAcetam (KEPPRA) 500 MG Tab 60 tablet 11     Sig: Take 1/2 tablet twice daily for a week than increase to 1 tablet twice daily after that       No orders of the defined types were placed in this encounter.

## 2022-06-29 NOTE — PATIENT INSTRUCTIONS
Continue topamax 200mg twice daily for now  Start keppra 1/2 tablet twice daily for a week then take 1 tablet twice daily after that

## 2022-07-26 ENCOUNTER — PATIENT MESSAGE (OUTPATIENT)
Dept: FAMILY MEDICINE | Facility: CLINIC | Age: 27
End: 2022-07-26
Payer: COMMERCIAL

## 2022-07-26 DIAGNOSIS — J30.1 SEASONAL ALLERGIC RHINITIS DUE TO POLLEN: Chronic | ICD-10-CM

## 2022-07-26 DIAGNOSIS — R05.3 CHRONIC COUGH: Chronic | ICD-10-CM

## 2022-07-26 DIAGNOSIS — Z87.09 HISTORY OF ASTHMA: ICD-10-CM

## 2022-07-26 DIAGNOSIS — G40.309 GENERALIZED EPILEPSY: ICD-10-CM

## 2022-07-26 RX ORDER — MONTELUKAST SODIUM 10 MG/1
10 TABLET ORAL NIGHTLY
Qty: 90 TABLET | Refills: 1 | Status: SHIPPED | OUTPATIENT
Start: 2022-07-26 | End: 2023-02-10 | Stop reason: SDUPTHER

## 2022-08-03 ENCOUNTER — OFFICE VISIT (OUTPATIENT)
Dept: NEUROLOGY | Facility: CLINIC | Age: 27
End: 2022-08-03
Payer: COMMERCIAL

## 2022-08-03 VITALS
WEIGHT: 315 LBS | BODY MASS INDEX: 39.17 KG/M2 | RESPIRATION RATE: 18 BRPM | DIASTOLIC BLOOD PRESSURE: 70 MMHG | HEART RATE: 69 BPM | OXYGEN SATURATION: 98 % | SYSTOLIC BLOOD PRESSURE: 116 MMHG | HEIGHT: 75 IN

## 2022-08-03 DIAGNOSIS — G40.309 GENERALIZED EPILEPSY: Primary | ICD-10-CM

## 2022-08-03 PROCEDURE — 99213 PR OFFICE/OUTPT VISIT, EST, LEVL III, 20-29 MIN: ICD-10-PCS | Mod: S$PBB,,, | Performed by: NURSE PRACTITIONER

## 2022-08-03 PROCEDURE — 99214 OFFICE O/P EST MOD 30 MIN: CPT | Mod: PBBFAC | Performed by: NURSE PRACTITIONER

## 2022-08-03 PROCEDURE — 99213 OFFICE O/P EST LOW 20 MIN: CPT | Mod: S$PBB,,, | Performed by: NURSE PRACTITIONER

## 2022-08-03 NOTE — PATIENT INSTRUCTIONS
Continue keppra 500mg twice daily  Decrease morning dose topamax to 1/2 tablet for 2 weeks then decrease night dosing of topamax to 1/2 tablet.  After 2 weeks stop the morning dose of topamax and 2 weeks later stop the night dose of topamax  Notify clinic if any seizures

## 2022-08-03 NOTE — PROGRESS NOTES
Subjective:       Patient ID: Rito Bowens is a 26 y.o. male     Chief Complaint:    Chief Complaint   Patient presents with    Follow-up    Seizures        Allergies:  Amoxicillin    Current Medications:    Outpatient Encounter Medications as of 8/3/2022   Medication Sig Dispense Refill    citalopram (CELEXA) 20 MG tablet Take 1 tablet (20 mg total) by mouth once daily. 90 tablet 3    ergocalciferol (ERGOCALCIFEROL) 50,000 unit Cap Take 1 capsule (50,000 Units total) by mouth every 30 days. 3 capsule 3    levETIRAcetam (KEPPRA) 500 MG Tab Take 1/2 tablet twice daily for a week than increase to 1 tablet twice daily after that 60 tablet 11    montelukast (SINGULAIR) 10 mg tablet Take 1 tablet (10 mg total) by mouth every evening. 90 tablet 1    topiramate (TOPAMAX) 200 MG Tab Take 1 tablet (200 mg total) by mouth 2 (two) times daily. 180 tablet 3     No facility-administered encounter medications on file as of 8/3/2022.       History of Present Illness  27 y/o WM here today for f/u for primary generalized epilepsy.      Last reported seizure was in July 2019, at that time was on amoxicillin and his Trokendi XR dosage had been decreased.  However in September 2021 his insurance would no longer cover the Trokendi thus we had to change him to IR topamax 200mg BID.  He denies any complications with the medication, no seizures and no side effects.  However, I had noted last visit he was treated in the ER for kidney stones, which topamax is not recommended with that history.  We discussed options of other medications and keppra was ultimately agreed on.  Today he is taking 500mg Bid and tolerating well.  Will plan to slow taper him down over the next two months on the topamax.    Originally he was treated with depakote but continued with seizures and was changed to topamax and ultimately trokendi XR due to compliance issues and has been on it ever since.         Review of Systems  Review of Systems    Constitutional: Negative for diaphoresis and fever.   HENT: Negative for congestion, hearing loss and tinnitus.    Eyes: Negative for blurred vision, double vision, photophobia, discharge and redness.   Respiratory: Negative for cough and shortness of breath.    Cardiovascular: Negative for chest pain.   Gastrointestinal: Negative for abdominal pain, nausea and vomiting.   Musculoskeletal: Negative for back pain, joint pain, myalgias and neck pain.   Skin: Negative for itching and rash.   Neurological: Negative for dizziness, tremors, sensory change, speech change, focal weakness, seizures, loss of consciousness, weakness and headaches.   Psychiatric/Behavioral: Negative for depression, hallucinations and memory loss. The patient does not have insomnia.    All other systems reviewed and are negative.     Objective:     NEUROLOGICAL EXAMINATION:     MENTAL STATUS   Oriented to person, place, and time.   Registration: recalls 3 of 3 objects. Recall at 5 minutes: recalls 3 of 3 objects.   Attention: normal. Concentration: normal.   Speech: speech is normal   Level of consciousness: alert  Knowledge: good and consistent with education.   Normal comprehension.     CRANIAL NERVES     CN II   Visual fields full to confrontation.   Visual acuity: normal  Right visual field deficit: none  Left visual field deficit: none     CN III, IV, VI   Pupils are equal, round, and reactive to light.  Extraocular motions are normal.   Right pupil: Size: 3 mm. Shape: regular. Reactivity: brisk. Consensual response: intact. Accommodation: intact.   Left pupil: Size: 3 mm. Shape: regular. Reactivity: brisk. Consensual response: intact. Accommodation: intact.   CN III: no CN III palsy  CN VI: no CN VI palsy  Nystagmus: none   Diplopia: none  Upgaze: normal  Downgaze: normal  Conjugate gaze: present  Vestibulo-ocular reflex: present    CN V   Facial sensation intact.   Right facial sensation deficit: none  Left facial sensation deficit:  none  Right corneal reflex: normal  Left corneal reflex: normal    CN VII   Facial expression full, symmetric.   Right facial weakness: none  Left facial weakness: none  Right taste: normal  Left taste: normal    CN VIII   CN VIII normal.   Hearing: intact    CN IX, X   CN IX normal.   CN X normal.   Palate: symmetric    CN XI   CN XI normal.   Right sternocleidomastoid strength: normal  Left sternocleidomastoid strength: normal  Right trapezius strength: normal  Left trapezius strength: normal    CN XII   CN XII normal.   Tongue: not atrophic  Fasciculations: absent  Tongue deviation: none    MOTOR EXAM   Muscle bulk: normal  Overall muscle tone: normal  Right arm tone: normal  Left arm tone: normal  Right arm pronator drift: absent  Left arm pronator drift: absent  Right leg tone: normal  Left leg tone: normal    Strength   Right neck flexion: 5/5  Left neck flexion: 5/5  Right neck extension: 5/5  Left neck extension: 5/5  Right deltoid: 5/5  Left deltoid: 5/5  Right biceps: 5/5  Left biceps: 5/5  Right triceps: 5/5  Left triceps: 5/5  Right wrist flexion: 5/5  Left wrist flexion: 5/5  Right wrist extension: 5/5  Left wrist extension: 5/5  Right interossei: 5/5  Left interossei: 5/5  Right iliopsoas: 5/5  Left iliopsoas: 5/5  Right quadriceps: 5/5  Left quadriceps: 5/5  Right hamstrin/5  Left hamstrin/5  Right anterior tibial: 5/5  Left anterior tibial: 5/5  Right posterior tibial: 5/5  Left posterior tibial: 5/5  Right gastroc: 5/5  Left gastroc: 5/5    REFLEXES     Reflexes   Right brachioradialis: 2+  Left brachioradialis: 2+  Right biceps: 2+  Left biceps: 2+  Right triceps: 2+  Left triceps: 2+  Right patellar: 2+  Left patellar: 2+  Right achilles: 2+  Left achilles: 2+  Right plantar: normal  Left plantar: normal  Right Stark: absent  Left Stark: absent  Right ankle clonus: absent  Left ankle clonus: absent  Right pendular knee jerk: absent  Left pendular knee jerk: absent    SENSORY EXAM    Light touch normal.   Right arm light touch: normal  Left arm light touch: normal  Right leg light touch: normal  Left leg light touch: normal  Vibration normal.   Right arm vibration: normal  Left arm vibration: normal  Right leg vibration: normal  Left leg vibration: normal  Proprioception normal.   Right arm proprioception: normal  Left arm proprioception: normal  Right leg proprioception: normal  Left leg proprioception: normal  Pinprick normal.   Right arm pinprick: normal  Left arm pinprick: normal  Right leg pinprick: normal  Left leg pinprick: normal  Graphesthesia: normal  Romberg: negative  Stereognosis: normal    GAIT AND COORDINATION     Gait  Gait: normal     Coordination   Finger to nose coordination: normal  Heel to shin coordination: normal  Tandem walking coordination: normal    Tremor   Resting tremor: absent  Intention tremor: absent  Action tremor: absent       Physical Exam  Vitals and nursing note reviewed.   Constitutional:       Appearance: Normal appearance.   HENT:      Head: Normocephalic.   Eyes:      Extraocular Movements: EOM normal.      Pupils: Pupils are equal, round, and reactive to light.   Cardiovascular:      Rate and Rhythm: Normal rate and regular rhythm.      Pulses: Normal pulses.      Heart sounds: Normal heart sounds.   Pulmonary:      Effort: Pulmonary effort is normal.      Breath sounds: Normal breath sounds.   Musculoskeletal:         General: Normal range of motion.      Cervical back: Normal range of motion and neck supple.   Skin:     General: Skin is warm and dry.   Neurological:      General: No focal deficit present.      Mental Status: He is alert and oriented to person, place, and time.      Cranial Nerves: No cranial nerve deficit.      Sensory: No sensory deficit.      Motor: No weakness.      Coordination: Coordination normal. Finger-Nose-Finger Test, Heel to Shin Test and Romberg Test normal.      Gait: Gait is intact. Gait and tandem walk normal.       Deep Tendon Reflexes: Reflexes normal.      Reflex Scores:       Tricep reflexes are 2+ on the right side and 2+ on the left side.       Bicep reflexes are 2+ on the right side and 2+ on the left side.       Brachioradialis reflexes are 2+ on the right side and 2+ on the left side.       Patellar reflexes are 2+ on the right side and 2+ on the left side.       Achilles reflexes are 2+ on the right side and 2+ on the left side.  Psychiatric:         Mood and Affect: Mood normal.         Speech: Speech normal.         Behavior: Behavior normal.          Assessment:     Problem List Items Addressed This Visit        Neuro    Generalized epilepsy - Primary           Primary Diagnosis and ICD10  Generalized epilepsy [G40.309]    Plan:     Patient Instructions   1. Continue keppra 500mg twice daily  2. Decrease morning dose topamax to 1/2 tablet for 2 weeks then decrease night dosing of topamax to 1/2 tablet.  After 2 weeks stop the morning dose of topamax and 2 weeks later stop the night dose of topamax  3. Notify clinic if any seizures      There are no discontinued medications.    Requested Prescriptions      No prescriptions requested or ordered in this encounter       No orders of the defined types were placed in this encounter.

## 2022-08-06 ENCOUNTER — HOSPITAL ENCOUNTER (EMERGENCY)
Facility: HOSPITAL | Age: 27
Discharge: HOME | End: 2022-08-07
Payer: COMMERCIAL

## 2022-08-06 DIAGNOSIS — N23 RENAL COLIC ON LEFT SIDE: ICD-10-CM

## 2022-08-06 DIAGNOSIS — N20.1 LEFT URETERAL STONE: Primary | ICD-10-CM

## 2022-08-06 LAB
ALBUMIN SERPL BCP-MCNC: 4 G/DL (ref 3.5–5)
ALBUMIN/GLOB SERPL: 0.9 {RATIO}
ALP SERPL-CCNC: 107 U/L (ref 45–115)
ALT SERPL W P-5'-P-CCNC: 66 U/L (ref 16–61)
ANION GAP SERPL CALCULATED.3IONS-SCNC: 15 MMOL/L (ref 7–16)
AST SERPL W P-5'-P-CCNC: 21 U/L (ref 15–37)
BACTERIA #/AREA URNS HPF: NORMAL /HPF
BASOPHILS # BLD AUTO: 0.04 K/UL (ref 0–0.2)
BASOPHILS NFR BLD AUTO: 0.3 % (ref 0–1)
BILIRUB SERPL-MCNC: 0.3 MG/DL (ref 0–1.2)
BILIRUB UR QL STRIP: NEGATIVE
BUN SERPL-MCNC: 11 MG/DL (ref 7–18)
BUN/CREAT SERPL: 8 (ref 6–20)
CALCIUM SERPL-MCNC: 9.1 MG/DL (ref 8.5–10.1)
CHLORIDE SERPL-SCNC: 106 MMOL/L (ref 98–107)
CLARITY UR: CLEAR
CO2 SERPL-SCNC: 26 MMOL/L (ref 21–32)
COLOR UR: YELLOW
CREAT SERPL-MCNC: 1.3 MG/DL (ref 0.7–1.3)
DIFFERENTIAL METHOD BLD: ABNORMAL
EOSINOPHIL # BLD AUTO: 0.16 K/UL (ref 0–0.5)
EOSINOPHIL NFR BLD AUTO: 1.3 % (ref 1–4)
ERYTHROCYTE [DISTWIDTH] IN BLOOD BY AUTOMATED COUNT: 13.8 % (ref 11.5–14.5)
GLOBULIN SER-MCNC: 4.3 G/DL (ref 2–4)
GLUCOSE SERPL-MCNC: 96 MG/DL (ref 74–106)
GLUCOSE UR STRIP-MCNC: NEGATIVE MG/DL
HCT VFR BLD AUTO: 45.7 % (ref 40–54)
HGB BLD-MCNC: 16 G/DL (ref 13.5–18)
KETONES UR STRIP-SCNC: NEGATIVE MG/DL
LEUKOCYTE ESTERASE UR QL STRIP: NEGATIVE
LYMPHOCYTES # BLD AUTO: 3.62 K/UL (ref 1–4.8)
LYMPHOCYTES NFR BLD AUTO: 30.3 % (ref 27–41)
MCH RBC QN AUTO: 27.6 PG (ref 27–31)
MCHC RBC AUTO-ENTMCNC: 35 G/DL (ref 32–36)
MCV RBC AUTO: 78.8 FL (ref 80–96)
MONOCYTES # BLD AUTO: 1.04 K/UL (ref 0–0.8)
MONOCYTES NFR BLD AUTO: 8.7 % (ref 2–6)
MPC BLD CALC-MCNC: 10.7 FL (ref 9.4–12.4)
MUCOUS THREADS #/AREA URNS HPF: NORMAL /HPF
NEUTROPHILS # BLD AUTO: 7.08 K/UL (ref 1.8–7.7)
NEUTROPHILS NFR BLD AUTO: 59.4 % (ref 53–65)
NITRITE UR QL STRIP: NEGATIVE
PH UR STRIP: 5.5 PH UNITS
PLATELET # BLD AUTO: 285 K/UL (ref 150–400)
POTASSIUM SERPL-SCNC: 4 MMOL/L (ref 3.5–5.1)
PROT SERPL-MCNC: 8.3 G/DL (ref 6.4–8.2)
PROT UR QL STRIP: NEGATIVE
RBC # BLD AUTO: 5.8 M/UL (ref 4.6–6.2)
RBC # UR STRIP: ABNORMAL /UL
RBC #/AREA URNS HPF: NORMAL /HPF
SODIUM SERPL-SCNC: 143 MMOL/L (ref 136–145)
SP GR UR STRIP: >=1.03
SQUAMOUS #/AREA URNS LPF: NORMAL /LPF
UROBILINOGEN UR STRIP-ACNC: 0.2 MG/DL
WBC # BLD AUTO: 11.94 K/UL (ref 4.5–11)
WBC #/AREA URNS HPF: NORMAL /HPF

## 2022-08-06 PROCEDURE — 99285 EMERGENCY DEPT VISIT HI MDM: CPT | Mod: 25

## 2022-08-06 PROCEDURE — 25000003 PHARM REV CODE 250: Performed by: NURSE PRACTITIONER

## 2022-08-06 PROCEDURE — 63600175 PHARM REV CODE 636 W HCPCS: Performed by: NURSE PRACTITIONER

## 2022-08-06 PROCEDURE — 96374 THER/PROPH/DIAG INJ IV PUSH: CPT

## 2022-08-06 PROCEDURE — 99284 EMERGENCY DEPT VISIT MOD MDM: CPT | Mod: ,,, | Performed by: NURSE PRACTITIONER

## 2022-08-06 PROCEDURE — 51702 INSERT TEMP BLADDER CATH: CPT

## 2022-08-06 PROCEDURE — 80053 COMPREHEN METABOLIC PANEL: CPT | Performed by: NURSE PRACTITIONER

## 2022-08-06 PROCEDURE — 96361 HYDRATE IV INFUSION ADD-ON: CPT

## 2022-08-06 PROCEDURE — 99284 PR EMERGENCY DEPT VISIT,LEVEL IV: ICD-10-PCS | Mod: ,,, | Performed by: NURSE PRACTITIONER

## 2022-08-06 PROCEDURE — 85025 COMPLETE CBC W/AUTO DIFF WBC: CPT | Performed by: NURSE PRACTITIONER

## 2022-08-06 PROCEDURE — 96375 TX/PRO/DX INJ NEW DRUG ADDON: CPT

## 2022-08-06 PROCEDURE — 36415 COLL VENOUS BLD VENIPUNCTURE: CPT | Performed by: NURSE PRACTITIONER

## 2022-08-06 PROCEDURE — 81001 URINALYSIS AUTO W/SCOPE: CPT | Performed by: NURSE PRACTITIONER

## 2022-08-06 RX ORDER — ONDANSETRON 2 MG/ML
4 INJECTION INTRAMUSCULAR; INTRAVENOUS
Status: COMPLETED | OUTPATIENT
Start: 2022-08-06 | End: 2022-08-06

## 2022-08-06 RX ORDER — KETOROLAC TROMETHAMINE 30 MG/ML
15 INJECTION, SOLUTION INTRAMUSCULAR; INTRAVENOUS
Status: COMPLETED | OUTPATIENT
Start: 2022-08-06 | End: 2022-08-06

## 2022-08-06 RX ADMIN — KETOROLAC TROMETHAMINE 15 MG: 30 INJECTION, SOLUTION INTRAMUSCULAR at 10:08

## 2022-08-06 RX ADMIN — ONDANSETRON 4 MG: 2 INJECTION INTRAMUSCULAR; INTRAVENOUS at 10:08

## 2022-08-06 RX ADMIN — SODIUM CHLORIDE 1000 ML: 9 INJECTION, SOLUTION INTRAVENOUS at 10:08

## 2022-08-07 ENCOUNTER — TELEPHONE (OUTPATIENT)
Dept: EMERGENCY MEDICINE | Facility: HOSPITAL | Age: 27
End: 2022-08-07

## 2022-08-07 VITALS
SYSTOLIC BLOOD PRESSURE: 145 MMHG | TEMPERATURE: 98 F | HEART RATE: 75 BPM | WEIGHT: 315 LBS | DIASTOLIC BLOOD PRESSURE: 93 MMHG | RESPIRATION RATE: 18 BRPM | OXYGEN SATURATION: 99 % | BODY MASS INDEX: 39.17 KG/M2 | HEIGHT: 75 IN

## 2022-08-07 PROCEDURE — 25000003 PHARM REV CODE 250: Performed by: NURSE PRACTITIONER

## 2022-08-07 RX ORDER — TAMSULOSIN HYDROCHLORIDE 0.4 MG/1
0.4 CAPSULE ORAL
Status: COMPLETED | OUTPATIENT
Start: 2022-08-07 | End: 2022-08-07

## 2022-08-07 RX ORDER — TAMSULOSIN HYDROCHLORIDE 0.4 MG/1
0.4 CAPSULE ORAL DAILY
Qty: 10 CAPSULE | Refills: 0 | Status: SHIPPED | OUTPATIENT
Start: 2022-08-07 | End: 2022-08-17

## 2022-08-07 RX ORDER — TAMSULOSIN HYDROCHLORIDE 0.4 MG/1
0.4 CAPSULE ORAL DAILY
Qty: 10 CAPSULE | Refills: 0 | Status: SHIPPED | OUTPATIENT
Start: 2022-08-07 | End: 2022-08-07 | Stop reason: SDUPTHER

## 2022-08-07 RX ORDER — HYDROCODONE BITARTRATE AND ACETAMINOPHEN 5; 325 MG/1; MG/1
1 TABLET ORAL
Status: COMPLETED | OUTPATIENT
Start: 2022-08-07 | End: 2022-08-07

## 2022-08-07 RX ORDER — ONDANSETRON 4 MG/1
4 TABLET, ORALLY DISINTEGRATING ORAL EVERY 8 HOURS PRN
Qty: 10 TABLET | Refills: 0 | Status: SHIPPED | OUTPATIENT
Start: 2022-08-07 | End: 2022-08-17

## 2022-08-07 RX ORDER — HYDROCODONE BITARTRATE AND ACETAMINOPHEN 5; 325 MG/1; MG/1
1 TABLET ORAL EVERY 4 HOURS PRN
Qty: 10 TABLET | Refills: 0 | Status: SHIPPED | OUTPATIENT
Start: 2022-08-07

## 2022-08-07 RX ORDER — MORPHINE SULFATE 4 MG/ML
4 INJECTION, SOLUTION INTRAMUSCULAR; INTRAVENOUS
Status: DISCONTINUED | OUTPATIENT
Start: 2022-08-07 | End: 2022-08-07

## 2022-08-07 RX ADMIN — HYDROCODONE BITARTRATE AND ACETAMINOPHEN 1 TABLET: 5; 325 TABLET ORAL at 12:08

## 2022-08-07 RX ADMIN — TAMSULOSIN HYDROCHLORIDE 0.4 MG: 0.4 CAPSULE ORAL at 12:08

## 2022-08-07 NOTE — ED TRIAGE NOTES
Left flank and lower back pain that began to bother him about 2-3 days ago, about 2100 he began to have trouble urinating with increased pain

## 2022-08-07 NOTE — ED PROVIDER NOTES
Encounter Date: 8/6/2022       History     Chief Complaint   Patient presents with    Back Pain     Kidney stone with a history of stones     Presented with c/o pain in left flank and left groin that started yesterday and has worsened today. States nausea with no vomiting. States difficulty urinating at times but does not report obvious hematuria. Denies fever or chills. States pain is similar to previous episode of renal colic with stone. Did not see urologist since he passed with stone without difficulty then.         Review of patient's allergies indicates:   Allergen Reactions    Amoxicillin Other (See Comments)     Past Medical History:   Diagnosis Date    Attention deficit hyperactivity disorder     Epilepsy, unspecified, intractable, without status epilepticus     Headache     Juvenile myoclonic epilepsy      History reviewed. No pertinent surgical history.  Family History   Problem Relation Age of Onset    Heart disease Other     Hypertension Other     Diabetes Other     Cancer Other         type unknown     Social History     Tobacco Use    Smoking status: Former Smoker     Types: Cigarettes    Smokeless tobacco: Current User     Types: Snuff   Substance Use Topics    Alcohol use: Not Currently    Drug use: Never     Review of Systems   Constitutional: Positive for activity change. Negative for appetite change, chills and fever.   HENT: Negative.    Respiratory: Negative.    Cardiovascular: Negative.    Gastrointestinal: Positive for nausea. Negative for abdominal pain, diarrhea and vomiting.   Genitourinary: Positive for decreased urine volume, difficulty urinating, dysuria, flank pain (left), frequency and urgency. Negative for hematuria, penile swelling, scrotal swelling and testicular pain.   Skin: Negative.    Neurological: Negative.    Psychiatric/Behavioral: Negative.        Physical Exam     Initial Vitals [08/06/22 2216]   BP Pulse Resp Temp SpO2   (!) 155/85 75 18 97.6 °F (36.4 °C)  98 %      MAP       --         Physical Exam    Nursing note and vitals reviewed.  Constitutional: He appears well-developed and well-nourished. No distress.   HENT:   Head: Normocephalic.   Right Ear: External ear normal.   Left Ear: External ear normal.   Mouth/Throat: Oropharynx is clear and moist.   Eyes: Conjunctivae and EOM are normal.   Neck: Neck supple.   Normal range of motion.  Cardiovascular: Normal rate, regular rhythm, normal heart sounds and intact distal pulses.   Pulmonary/Chest: Breath sounds normal.   Abdominal: Abdomen is soft. Bowel sounds are normal. He exhibits no distension. There is no abdominal tenderness.   There is left CVA tenderness. There is no rebound, no guarding and no tenderness at McBurney's point.   Musculoskeletal:         General: Normal range of motion.      Cervical back: Normal range of motion and neck supple.     Neurological: He is alert and oriented to person, place, and time. He has normal strength. GCS score is 15. GCS eye subscore is 4. GCS verbal subscore is 5. GCS motor subscore is 6.   Skin: Skin is warm and dry. Capillary refill takes less than 2 seconds.   Psychiatric: He has a normal mood and affect.         Medical Screening Exam   See Full Note    ED Course   Procedures  Labs Reviewed   COMPREHENSIVE METABOLIC PANEL - Abnormal; Notable for the following components:       Result Value    Total Protein 8.3 (*)     Globulin 4.3 (*)     ALT 66 (*)     All other components within normal limits   URINALYSIS, REFLEX TO URINE CULTURE - Abnormal; Notable for the following components:    Blood, UA Trace-Intact (*)     Specific Gravity, UA >=1.030 (*)     All other components within normal limits   CBC WITH DIFFERENTIAL - Abnormal; Notable for the following components:    WBC 11.94 (*)     MCV 78.8 (*)     Monocytes % 8.7 (*)     Monocytes, Absolute 1.04 (*)     All other components within normal limits   URINALYSIS, MICROSCOPIC - Normal   CBC W/ AUTO DIFFERENTIAL     Narrative:     The following orders were created for panel order CBC auto differential.  Procedure                               Abnormality         Status                     ---------                               -----------         ------                     CBC with Differential[[id number]]        Abnormal            Final result                 Please view results for these tests on the individual orders.          Imaging Results          CT Renal Stone Study ABD Pelvis WO (In process)               X-Rays:   Independently Interpreted Readings:   Abdomen: Left kidney show hydronephrosis and perinephric fat stranding with 4 mm obstruction calculus at left UVJ. Nonobstructive bowel gas pattern     Medications   HYDROcodone-acetaminophen 5-325 mg per tablet 1 tablet (has no administration in time range)   tamsulosin 24 hr capsule 0.4 mg (has no administration in time range)   ketorolac injection 15 mg (15 mg Intravenous Given 8/6/22 2240)   ondansetron injection 4 mg (4 mg Intravenous Given 8/6/22 2239)   sodium chloride 0.9% bolus 1,000 mL (0 mLs Intravenous Stopped 8/6/22 2328)     Medical Decision Making:   ED Management:  Toradol, ondansetron and NS bolus given. States pain improved. Denies nausea. No vomiting. Urine is positive for blood. CT shows 4 mm stone at left UVJ. Pt states feels like he needs to urinate but cant. Straight cath done with little return of urine. Pt noted return of discomfort in left groin area. Refused Morphine. Norco and Flomax given. Instructed on home care and need to follow-up with urology early next week if stone has not passed. Referral sent to Dr. Blanchard.                   Clinical Impression:   Final diagnoses:  [N23] Renal colic on left side  [N20.1] Left ureteral stone (Primary)          ED Disposition Condition    Discharge Stable        ED Prescriptions     Medication Sig Dispense Start Date End Date Auth. Provider    HYDROcodone-acetaminophen (NORCO) 5-325 mg per tablet Take  1 tablet by mouth every 4 (four) hours as needed for Pain. 10 tablet 8/7/2022  Venita Munoz NP    ondansetron (ZOFRAN-ODT) 4 MG TbDL Take 1 tablet (4 mg total) by mouth every 8 (eight) hours as needed. 10 tablet 8/7/2022 8/17/2022 Venita Munoz NP    tamsulosin (FLOMAX) 0.4 mg Cap  (Status: Discontinued) Take 1 capsule (0.4 mg total) by mouth once daily. 10 capsule 8/7/2022 8/7/2022 Venita Munoz NP    tamsulosin (FLOMAX) 0.4 mg Cap  (Status: Discontinued) Take 1 capsule (0.4 mg total) by mouth once daily. 10 capsule 8/7/2022 8/7/2022 Venita Munoz NP    tamsulosin (FLOMAX) 0.4 mg Cap Take 1 capsule (0.4 mg total) by mouth once daily. for 10 doses 10 capsule 8/7/2022 8/17/2022 Venita Munoz NP        Follow-up Information     Follow up With Specialties Details Why Contact Info    [facility] - Emergency Department Emergency Medicine  If symptoms worsen [address] [facility] [address] [phone]    Joseluis Blanchard Jr., MD Urology In 2 days  [address] [place] [id number]  [phone]             Venita Munoz NP  08/07/22 0055

## 2022-08-07 NOTE — DISCHARGE INSTRUCTIONS
Strain your urine to attempt to catch a stone. Take Norco as needed for pain and ondansetron as needed for nausea. You will need to follow-up with a urologist if you do not pass the stone. A referral has been sent to the office of Dr. Blanchard. An office representative should contact you Monday with an appointment. Return to the ED for any new or worsening symptoms.

## 2022-08-16 ENCOUNTER — OFFICE VISIT (OUTPATIENT)
Dept: UROLOGY | Facility: CLINIC | Age: 27
End: 2022-08-16
Payer: COMMERCIAL

## 2022-08-16 ENCOUNTER — HOSPITAL ENCOUNTER (OUTPATIENT)
Dept: RADIOLOGY | Facility: HOSPITAL | Age: 27
Discharge: HOME | End: 2022-08-16
Attending: NURSE PRACTITIONER
Payer: COMMERCIAL

## 2022-08-16 VITALS
WEIGHT: 308 LBS | OXYGEN SATURATION: 96 % | SYSTOLIC BLOOD PRESSURE: 136 MMHG | DIASTOLIC BLOOD PRESSURE: 82 MMHG | TEMPERATURE: 98 F | HEART RATE: 96 BPM | HEIGHT: 75 IN | BODY MASS INDEX: 38.3 KG/M2

## 2022-08-16 DIAGNOSIS — N20.1 LEFT URETERAL STONE: Primary | ICD-10-CM

## 2022-08-16 DIAGNOSIS — N20.1 LEFT URETERAL STONE: ICD-10-CM

## 2022-08-16 LAB
BILIRUB UR QL STRIP: NEGATIVE
CLARITY UR: CLEAR
COLOR UR: YELLOW
GLUCOSE UR STRIP-MCNC: NORMAL MG/DL
KETONES UR STRIP-SCNC: NEGATIVE MG/DL
LEUKOCYTE ESTERASE UR QL STRIP: ABNORMAL
MUCOUS, UA: ABNORMAL /LPF
NITRITE UR QL STRIP: NEGATIVE
PH UR STRIP: 5.5 PH UNITS
PROT UR QL STRIP: 20
RBC # UR STRIP: NEGATIVE /UL
RBC #/AREA URNS HPF: 2 /HPF
SP GR UR STRIP: 1.03
UROBILINOGEN UR STRIP-ACNC: 2 MG/DL
WBC #/AREA URNS HPF: 5 /HPF

## 2022-08-16 PROCEDURE — 74018 RADEX ABDOMEN 1 VIEW: CPT | Mod: TC

## 2022-08-16 PROCEDURE — 81001 URINALYSIS AUTO W/SCOPE: CPT | Mod: ,,, | Performed by: CLINICAL MEDICAL LABORATORY

## 2022-08-16 PROCEDURE — 87086 CULTURE, URINE: ICD-10-PCS | Mod: ,,, | Performed by: CLINICAL MEDICAL LABORATORY

## 2022-08-16 PROCEDURE — 99203 PR OFFICE/OUTPT VISIT, NEW, LEVL III, 30-44 MIN: ICD-10-PCS | Mod: S$PBB,,, | Performed by: NURSE PRACTITIONER

## 2022-08-16 PROCEDURE — 87086 URINE CULTURE/COLONY COUNT: CPT | Mod: ,,, | Performed by: CLINICAL MEDICAL LABORATORY

## 2022-08-16 PROCEDURE — 74018 XR KUB: ICD-10-PCS | Mod: 26,,, | Performed by: RADIOLOGY

## 2022-08-16 PROCEDURE — 99215 OFFICE O/P EST HI 40 MIN: CPT | Mod: PBBFAC | Performed by: NURSE PRACTITIONER

## 2022-08-16 PROCEDURE — 81001 URINALYSIS: ICD-10-PCS | Mod: ,,, | Performed by: CLINICAL MEDICAL LABORATORY

## 2022-08-16 PROCEDURE — 74018 RADEX ABDOMEN 1 VIEW: CPT | Mod: 26,,, | Performed by: RADIOLOGY

## 2022-08-16 PROCEDURE — 99203 OFFICE O/P NEW LOW 30 MIN: CPT | Mod: S$PBB,,, | Performed by: NURSE PRACTITIONER

## 2022-08-16 NOTE — PROGRESS NOTES
"Subjective:       Patient ID: Rito Bowens is a 26 y.o. male.    Chief Complaint: Nephrolithiasis (Left ureteral stone RF Venita Munoz NP)    Mr. Bowens is a pleasant 27 yo gentleman who is here today for initial Urological evaluation/managenment of left ureteral stone.    Patient presented to ED on the 6th with c/o  c/o left flank pain with radiation to his groin which progressed over 24 hour period.  No associated N/V, fever or chills.  He relates that this pain is similar to previous episodes of renal colic with past stones, and that none have required Urological intervention to date.   Patient was discharged on Dilaudid, Zofran and flomax.  CT Imaging 8/6 resulted " 4 mm stone distal left-sided ureter resulting in mild to moderate hydroureteronephrosis. Multiple subcentimeter stones scattered throughout the kidneys." at time of ED visit.  Measured at 8 mm x 4.3 mm on review of imaging, with one additional right renal stone present which measures 3.5 mm.  Patient states he does not feel stone has passed, but not straining his urine.  He has irritative voiding, mild straining.  Urine culture was not performed.  Patient sent for KUB for further evaluation.  Dr. Blanchard to room to review imaging and speak with patient.  (8/16/2022)--------------------------------------------------------------------------------      Review of Systems   Constitutional: Negative.    HENT: Negative.    Eyes: Negative.    Respiratory: Negative.    Cardiovascular: Negative.    Gastrointestinal: Negative.    Endocrine: Negative.    Genitourinary: Positive for urgency. Negative for decreased urine volume, difficulty urinating, dysuria, enuresis, flank pain, frequency, hematuria, penile discharge, penile pain, penile swelling, scrotal swelling and testicular pain.        "mild straining, pressure left pelvis with urination"   Musculoskeletal: Negative.    Allergic/Immunologic: Negative.    Neurological: Negative.    Hematological: " Negative.    Psychiatric/Behavioral: Negative.        Objective:      Physical Exam  Vitals and nursing note reviewed. Exam conducted with a chaperone present (mother).   Constitutional:       General: He is not in acute distress.     Appearance: Normal appearance. He is obese. He is not ill-appearing, toxic-appearing or diaphoretic.   HENT:      Head: Normocephalic.      Right Ear: External ear normal.      Left Ear: External ear normal.      Nose: Nose normal.      Mouth/Throat:      Mouth: Mucous membranes are moist.      Pharynx: Oropharynx is clear.   Eyes:      General: No scleral icterus.  Neck:      Vascular: No carotid bruit.   Cardiovascular:      Rate and Rhythm: Normal rate and regular rhythm.      Pulses: Normal pulses.      Heart sounds: Normal heart sounds. No murmur heard.  Pulmonary:      Effort: Pulmonary effort is normal.      Breath sounds: Normal breath sounds.   Abdominal:      General: Bowel sounds are normal. There is no distension.      Palpations: Abdomen is soft. There is no mass.      Tenderness: There is no abdominal tenderness. There is no right CVA tenderness, left CVA tenderness or guarding.   Musculoskeletal:         General: Normal range of motion.      Cervical back: Normal range of motion and neck supple. No rigidity or tenderness.      Right lower leg: No edema.      Left lower leg: No edema.   Lymphadenopathy:      Cervical: No cervical adenopathy.   Skin:     General: Skin is warm and dry.      Capillary Refill: Capillary refill takes less than 2 seconds.      Coloration: Skin is not jaundiced or pale.      Findings: No bruising, erythema, lesion or rash.   Neurological:      General: No focal deficit present.      Mental Status: He is alert and oriented to person, place, and time. Mental status is at baseline.   Psychiatric:         Mood and Affect: Mood normal.         Behavior: Behavior normal.         Thought Content: Thought content normal.         Judgment: Judgment  normal.         Assessment:       1. Left ureteral stone        Plan:       Left ureteral stone  · Dr. Blanchard to room to view imaging with patient /evaluation.  · Patient elects to give stone 3 more weeks, if he becomes symptomatic will return to clinic for repeat CT imaging and intervention if indicated.  · Urine studies

## 2022-08-17 NOTE — ASSESSMENT & PLAN NOTE
· Dr. Blanchard to room to view imaging with patient /evaluation.  · Patient elects to give stone 3 more weeks, if he becomes symptomatic will return to clinic for repeat CT imaging and intervention if indicated.  · Urine studies

## 2022-08-18 ENCOUNTER — PATIENT MESSAGE (OUTPATIENT)
Dept: UROLOGY | Facility: CLINIC | Age: 27
End: 2022-08-18

## 2022-08-18 LAB — UA COMPLETE W REFLEX CULTURE PNL UR: NO GROWTH

## 2022-08-24 ENCOUNTER — HOSPITAL ENCOUNTER (OUTPATIENT)
Dept: RADIOLOGY | Facility: HOSPITAL | Age: 27
Discharge: HOME | End: 2022-08-24
Attending: NURSE PRACTITIONER
Payer: COMMERCIAL

## 2022-08-24 ENCOUNTER — OFFICE VISIT (OUTPATIENT)
Dept: UROLOGY | Facility: CLINIC | Age: 27
End: 2022-08-24
Payer: COMMERCIAL

## 2022-08-24 ENCOUNTER — PATIENT MESSAGE (OUTPATIENT)
Dept: UROLOGY | Facility: CLINIC | Age: 27
End: 2022-08-24

## 2022-08-24 VITALS
HEART RATE: 62 BPM | BODY MASS INDEX: 38.3 KG/M2 | OXYGEN SATURATION: 99 % | WEIGHT: 308 LBS | HEIGHT: 75 IN | TEMPERATURE: 98 F | SYSTOLIC BLOOD PRESSURE: 130 MMHG | DIASTOLIC BLOOD PRESSURE: 90 MMHG

## 2022-08-24 DIAGNOSIS — M54.50 BILATERAL LOW BACK PAIN, UNSPECIFIED CHRONICITY, UNSPECIFIED WHETHER SCIATICA PRESENT: ICD-10-CM

## 2022-08-24 DIAGNOSIS — R10.9 ABDOMINAL PAIN, UNSPECIFIED ABDOMINAL LOCATION: ICD-10-CM

## 2022-08-24 DIAGNOSIS — N20.1 LEFT URETERAL STONE: Primary | ICD-10-CM

## 2022-08-24 DIAGNOSIS — N20.0 RIGHT RENAL STONE: ICD-10-CM

## 2022-08-24 DIAGNOSIS — M54.50 LOW BACK PAIN, UNSPECIFIED BACK PAIN LATERALITY, UNSPECIFIED CHRONICITY, UNSPECIFIED WHETHER SCIATICA PRESENT: Primary | ICD-10-CM

## 2022-08-24 DIAGNOSIS — N20.1 LEFT URETERAL STONE: ICD-10-CM

## 2022-08-24 PROCEDURE — 81001 URINALYSIS AUTO W/SCOPE: CPT | Mod: ,,, | Performed by: CLINICAL MEDICAL LABORATORY

## 2022-08-24 PROCEDURE — 99214 PR OFFICE/OUTPT VISIT, EST, LEVL IV, 30-39 MIN: ICD-10-PCS | Mod: S$PBB,,, | Performed by: NURSE PRACTITIONER

## 2022-08-24 PROCEDURE — 99214 OFFICE O/P EST MOD 30 MIN: CPT | Mod: PBBFAC | Performed by: NURSE PRACTITIONER

## 2022-08-24 PROCEDURE — 87086 CULTURE, URINE: ICD-10-PCS | Mod: ,,, | Performed by: CLINICAL MEDICAL LABORATORY

## 2022-08-24 PROCEDURE — 81001 URINALYSIS, MICROSCOPIC: ICD-10-PCS | Mod: ,,, | Performed by: CLINICAL MEDICAL LABORATORY

## 2022-08-24 PROCEDURE — 99214 OFFICE O/P EST MOD 30 MIN: CPT | Mod: S$PBB,,, | Performed by: NURSE PRACTITIONER

## 2022-08-24 PROCEDURE — 74018 XR KUB: ICD-10-PCS | Mod: 26,,, | Performed by: RADIOLOGY

## 2022-08-24 PROCEDURE — 74018 RADEX ABDOMEN 1 VIEW: CPT | Mod: TC

## 2022-08-24 PROCEDURE — 74018 RADEX ABDOMEN 1 VIEW: CPT | Mod: 26,,, | Performed by: RADIOLOGY

## 2022-08-24 PROCEDURE — 87086 URINE CULTURE/COLONY COUNT: CPT | Mod: ,,, | Performed by: CLINICAL MEDICAL LABORATORY

## 2022-08-24 RX ORDER — TIZANIDINE 4 MG/1
4 TABLET ORAL EVERY 8 HOURS
Qty: 5 TABLET | Refills: 0 | Status: SHIPPED | OUTPATIENT
Start: 2022-08-24 | End: 2022-09-03

## 2022-08-24 NOTE — PROGRESS NOTES
"Subjective:       Patient ID: Rito Bowens is a 26 y.o. male.    Chief Complaint: Other (Work in for pt stating he thinks his kidney stone is moving--had left flank pain earlier today when he stood up and moved and lower back pain)    Mr. Bowens is a pleasant 25 yo gentleman who is here today for initial Urological evaluation/managenment of left ureteral stone.    Patient presented to ED on the 6th with c/o  c/o left flank pain with radiation to his groin which progressed over 24 hour period.  No associated N/V, fever or chills.  He relates that this pain is similar to previous episodes of renal colic with past stones, and that none have required Urological intervention to date.   Patient was discharged on Dilaudid, Zofran and flomax.  CT Imaging 8/6 resulted " 4 mm stone distal left-sided ureter resulting in mild to moderate hydroureteronephrosis. Multiple subcentimeter stones scattered throughout the kidneys." at time of ED visit.  Measured at 8 mm x 4.3 mm on review of imaging, with one additional right renal stone present which measures 3.5 mm.  Patient states he does not feel stone has passed, but not straining his urine.  He has irritative voiding, mild straining.  Urine culture was not performed.  Patient sent for KUB for further evaluation.  Dr. Blanchard to room to review imaging and speak with patient.  (8/16/2022)--------------------------------------------------------------------------------      Mr. Bowens is a pleasant 25 yo gentleman who has been worked into clinic today for possible left sided renal colic.  Reports low back pain during work today, and then began having sharp left intermittent pain, stated as "9/10".  He left work and took a Norco which improved his symptoms.  Presently, states pain as 5-6/10.  No gross hematuria, but states it was very dark.  Patient states he has to strain, but this is a chronic issue for him.  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  Has history of chronic LBP.      Review of Systems   Constitutional: Negative.    Gastrointestinal: Negative.    Endocrine: Negative.    Genitourinary: Positive for flank pain. Negative for decreased urine volume, difficulty urinating, dysuria, enuresis, frequency, genital sores, hematuria, penile discharge, penile pain, penile swelling, scrotal swelling, testicular pain and urgency.        Left flank pain   Musculoskeletal: Positive for back pain and myalgias. Negative for arthralgias, gait problem and joint swelling.        Chronic low back pain   Skin: Negative.    Neurological: Negative.        Objective:      Physical Exam  Vitals and nursing note reviewed. Chaperone present: mother.   Constitutional:       General: He is not in acute distress.     Appearance: Normal appearance. He is obese. He is not ill-appearing, toxic-appearing or diaphoretic.   Eyes:      General: No scleral icterus.  Cardiovascular:      Rate and Rhythm: Normal rate and regular rhythm.      Pulses: Normal pulses.      Heart sounds: Normal heart sounds. No murmur heard.  Pulmonary:      Effort: Pulmonary effort is normal.      Breath sounds: Normal breath sounds.   Abdominal:      General: Bowel sounds are normal. There is no distension.      Palpations: Abdomen is soft. There is no mass.      Tenderness: There is no abdominal tenderness. There is no right CVA tenderness, left CVA tenderness or guarding.   Musculoskeletal:      Right lower leg: No edema.      Left lower leg: No edema.      Comments: Hips and shoulders assymetrical with tenderness across lower lumbar region   Skin:     General: Skin is warm and dry.      Coloration: Skin is not jaundiced or pale.   Neurological:      Mental Status: He is alert and oriented to person, place, and time. Mental status is at baseline.   Psychiatric:         Mood and Affect: Mood normal.         Behavior: Behavior normal.         Thought Content: Thought  content normal.         Judgment: Judgment normal.         Assessment:       1. Low back pain, unspecified back pain laterality, unspecified chronicity, unspecified whether sciatica present    2. Abdominal pain, unspecified abdominal location    3. Right renal stone    4. Bilateral low back pain, unspecified chronicity, unspecified whether sciatica present        Plan:       Right renal stone  · No ureteral stones seen on KUB.  Review of last CT shows small right renal stone.  · CT Stone Protocol, f/u afterwards  · UA, CX - Will check for microhematuria     Bilateral low back pain  · NSAIDS PRN OTC  · ZANAFLEX PRN MUSCLE SPASM  · RTC if not improved with treatment or worsening clinical s/o active stone occur for CT imaging.

## 2022-08-24 NOTE — ASSESSMENT & PLAN NOTE
· NSAIDS PRN OTC  · ZANAFLEX PRN MUSCLE SPASM  · RTC if not improved with treatment or worsening clinical s/o active stone occur for CT imaging.

## 2022-08-24 NOTE — ASSESSMENT & PLAN NOTE
· No ureteral stones seen on KUB.  Review of last CT shows small right renal stone.  · CT Stone Protocol, f/u afterwards  · UA, CX - Will check for microhematuria

## 2022-08-25 LAB
AMORPHOUS CRYSTALS, UA (OHS): ABNORMAL /HPF
BILIRUB UR QL STRIP: NEGATIVE
CLARITY UR: ABNORMAL
COLOR UR: ABNORMAL
GLUCOSE UR STRIP-MCNC: NORMAL MG/DL
KETONES UR STRIP-SCNC: NEGATIVE MG/DL
LEUKOCYTE ESTERASE UR QL STRIP: NEGATIVE
MUCOUS, UA: ABNORMAL /LPF
NITRITE UR QL STRIP: NEGATIVE
PH UR STRIP: 6 PH UNITS
PROT UR QL STRIP: 30
RBC # UR STRIP: NEGATIVE /UL
RBC #/AREA URNS HPF: 1 /HPF
SP GR UR STRIP: 1.03
SQUAMOUS #/AREA URNS LPF: ABNORMAL /HPF
UROBILINOGEN UR STRIP-ACNC: 3 MG/DL
WBC #/AREA URNS HPF: 3 /HPF

## 2022-08-27 LAB — UA COMPLETE W REFLEX CULTURE PNL UR: NO GROWTH

## 2022-08-30 ENCOUNTER — LAB VISIT (OUTPATIENT)
Dept: PRIMARY CARE CLINIC | Facility: CLINIC | Age: 27
End: 2022-08-30

## 2022-08-30 ENCOUNTER — HOSPITAL ENCOUNTER (OUTPATIENT)
Dept: RADIOLOGY | Facility: HOSPITAL | Age: 27
Discharge: HOME | End: 2022-08-30
Attending: NURSE PRACTITIONER
Payer: COMMERCIAL

## 2022-08-30 ENCOUNTER — OFFICE VISIT (OUTPATIENT)
Dept: PRIMARY CARE CLINIC | Facility: CLINIC | Age: 27
End: 2022-08-30
Payer: OTHER MISCELLANEOUS

## 2022-08-30 VITALS
TEMPERATURE: 98 F | DIASTOLIC BLOOD PRESSURE: 92 MMHG | HEART RATE: 78 BPM | BODY MASS INDEX: 38.3 KG/M2 | SYSTOLIC BLOOD PRESSURE: 119 MMHG | OXYGEN SATURATION: 98 % | HEIGHT: 75 IN | RESPIRATION RATE: 20 BRPM | WEIGHT: 308 LBS

## 2022-08-30 DIAGNOSIS — S90.32XA CONTUSION OF LEFT FOOT, INITIAL ENCOUNTER: ICD-10-CM

## 2022-08-30 DIAGNOSIS — S90.02XA CONTUSION OF LEFT ANKLE, INITIAL ENCOUNTER: ICD-10-CM

## 2022-08-30 DIAGNOSIS — Z02.83 ENCOUNTER FOR EMPLOYMENT-RELATED DRUG TESTING: Primary | ICD-10-CM

## 2022-08-30 DIAGNOSIS — S90.32XA CONTUSION OF LEFT FOOT, INITIAL ENCOUNTER: Primary | ICD-10-CM

## 2022-08-30 PROCEDURE — 99000 SPECIMEN HANDLING OFFICE-LAB: CPT | Mod: ,,, | Performed by: NURSE PRACTITIONER

## 2022-08-30 PROCEDURE — 73630 X-RAY EXAM OF FOOT: CPT | Mod: 26,LT,, | Performed by: RADIOLOGY

## 2022-08-30 PROCEDURE — 73630 X-RAY EXAM OF FOOT: CPT | Mod: TC,LT

## 2022-08-30 PROCEDURE — 99000 PR SPECIMEN HANDLING,DR OFF->LAB: ICD-10-PCS | Mod: ,,, | Performed by: NURSE PRACTITIONER

## 2022-08-30 PROCEDURE — 73630 XR FOOT COMPLETE 3 VIEW LEFT: ICD-10-PCS | Mod: 26,LT,, | Performed by: RADIOLOGY

## 2022-08-30 PROCEDURE — 73610 X-RAY EXAM OF ANKLE: CPT | Mod: TC,LT

## 2022-08-30 PROCEDURE — 99213 OFFICE O/P EST LOW 20 MIN: CPT | Mod: ,,, | Performed by: NURSE PRACTITIONER

## 2022-08-30 PROCEDURE — 73610 X-RAY EXAM OF ANKLE: CPT | Mod: 26,LT,, | Performed by: RADIOLOGY

## 2022-08-30 PROCEDURE — 99213 PR OFFICE/OUTPT VISIT, EST, LEVL III, 20-29 MIN: ICD-10-PCS | Mod: ,,, | Performed by: NURSE PRACTITIONER

## 2022-08-30 PROCEDURE — 73610 XR ANKLE COMPLETE 3 VIEW LEFT: ICD-10-PCS | Mod: 26,LT,, | Performed by: RADIOLOGY

## 2022-08-30 NOTE — PROGRESS NOTES
Subjective:       Patient ID: Rito Bowens is a 26 y.o. male.    Chief Complaint: Work Related Injury (Patient presents here today with work related injury to left foot that occurred on 8/30/22. Patient stated that he drop a pallet on his left foot. )    25 y/o wm presents with pain to his left foot and ankle. He accidentally dropped a pallet he was moving on his foot. No swelling noted. No abrasion noted.     Review of Systems   Musculoskeletal:  Negative for gait problem, joint swelling and joint deformity.   All other systems reviewed and are negative.      Objective:      Physical Exam  Vitals and nursing note reviewed.   Constitutional:       Appearance: Normal appearance.   HENT:      Head: Normocephalic.   Eyes:      Pupils: Pupils are equal, round, and reactive to light.   Cardiovascular:      Rate and Rhythm: Normal rate and regular rhythm.      Heart sounds: Normal heart sounds.   Pulmonary:      Effort: Pulmonary effort is normal.      Breath sounds: Normal breath sounds.   Musculoskeletal:         General: Tenderness and signs of injury present. No swelling or deformity. Normal range of motion.      Cervical back: Normal range of motion.   Skin:     General: Skin is warm and dry.      Findings: No bruising or erythema.   Neurological:      General: No focal deficit present.      Mental Status: He is alert and oriented to person, place, and time.   Psychiatric:         Attention and Perception: Attention and perception normal.         Mood and Affect: Mood normal.         Speech: Speech normal.         Behavior: Behavior normal. Behavior is cooperative.         Cognition and Memory: Cognition normal.         Judgment: Judgment normal.   Imaging: X-Ray Ankle Complete 3 View Left    Result Date: 8/30/2022  EXAMINATION: XR ANKLE COMPLETE 3 VIEW LEFT CLINICAL HISTORY: Contusion of left foot, initial encounter COMPARISON: None available FINDINGS: No evidence of fracture seen.  The alignment of the  joints appears normal.  No degenerative change is present.  No soft tissue abnormality is seen.     No evidence of abnormality demonstrated Electronically signed by: Trevor Joshi Date:    08/30/2022 Time:    13:45    X-Ray Foot Complete 3 view Left    Result Date: 8/30/2022  EXAMINATION: XR FOOT COMPLETE 3 VIEW LEFT CLINICAL HISTORY: Contusion of left foot, initial encounter COMPARISON: None available FINDINGS: No evidence of fracture seen.  The alignment of the joints appears normal.  No degenerative change is present.  No soft tissue abnormality is seen.     No evidence of abnormality demonstrated Electronically signed by: Trevor Joshi Date:    08/30/2022 Time:    13:45       Assessment:       Problem List Items Addressed This Visit          Orthopedic    Contusion of left foot - Primary    Relevant Orders    X-Ray Ankle Complete 3 View Left (Completed)    X-Ray Foot Complete 3 view Left (Completed)    Contusion of left ankle       Plan:       RTW full duty. RTN to [facility] PRN. Ice to left foot, elevate when seated, Ibuprofen as needed for pain.

## 2022-08-30 NOTE — PROGRESS NOTES
Subjective:       Patient ID: Rito Bowens is a 26 y.o. male.    Chief Complaint: No chief complaint on file.    HPI  Review of Systems      Objective:      Physical Exam    Assessment:       Problem List Items Addressed This Visit    None  Visit Diagnoses       Encounter for employment-related drug testing    -  Primary            Plan:       Drug testing only

## 2022-09-12 ENCOUNTER — PATIENT MESSAGE (OUTPATIENT)
Dept: NEUROLOGY | Facility: CLINIC | Age: 27
End: 2022-09-12

## 2022-09-12 ENCOUNTER — HOSPITAL ENCOUNTER (EMERGENCY)
Facility: HOSPITAL | Age: 27
Discharge: HOME | End: 2022-09-12
Payer: COMMERCIAL

## 2022-09-12 VITALS
WEIGHT: 308 LBS | DIASTOLIC BLOOD PRESSURE: 73 MMHG | BODY MASS INDEX: 38.3 KG/M2 | RESPIRATION RATE: 18 BRPM | HEART RATE: 93 BPM | HEIGHT: 75 IN | OXYGEN SATURATION: 97 % | SYSTOLIC BLOOD PRESSURE: 129 MMHG | TEMPERATURE: 98 F

## 2022-09-12 DIAGNOSIS — D72.829 LEUKOCYTOSIS, UNSPECIFIED TYPE: ICD-10-CM

## 2022-09-12 DIAGNOSIS — S09.90XA INJURY OF HEAD, INITIAL ENCOUNTER: ICD-10-CM

## 2022-09-12 DIAGNOSIS — G40.309 GENERALIZED EPILEPSY: Primary | ICD-10-CM

## 2022-09-12 DIAGNOSIS — G40.309 GENERALIZED EPILEPSY: ICD-10-CM
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
## 2022-09-12 DIAGNOSIS — G40.919 BREAKTHROUGH SEIZURE: Primary | ICD-10-CM

## 2022-09-12 LAB
ALBUMIN SERPL BCP-MCNC: 4.2 G/DL (ref 3.5–5)
ALBUMIN/GLOB SERPL: 1.1 {RATIO}
ALP SERPL-CCNC: 112 U/L (ref 45–115)
ALT SERPL W P-5'-P-CCNC: 51 U/L (ref 16–61)
AMPHET UR QL SCN: NEGATIVE
ANION GAP SERPL CALCULATED.3IONS-SCNC: 13 MMOL/L (ref 7–16)
AST SERPL W P-5'-P-CCNC: 23 U/L (ref 15–37)
BARBITURATES UR QL SCN: NEGATIVE
BASOPHILS # BLD AUTO: 0.02 K/UL (ref 0–0.2)
BASOPHILS NFR BLD AUTO: 0.1 % (ref 0–1)
BENZODIAZ METAB UR QL SCN: NEGATIVE
BILIRUB SERPL-MCNC: 0.3 MG/DL (ref ?–1.2)
BILIRUB UR QL STRIP: NEGATIVE
BUN SERPL-MCNC: 9 MG/DL (ref 7–18)
BUN/CREAT SERPL: 6 (ref 6–20)
CALCIUM SERPL-MCNC: 9.4 MG/DL (ref 8.5–10.1)
CANNABINOIDS UR QL SCN: NEGATIVE
CHLORIDE SERPL-SCNC: 106 MMOL/L (ref 98–107)
CK SERPL-CCNC: 106 U/L (ref 39–308)
CLARITY UR: CLEAR
CO2 SERPL-SCNC: 27 MMOL/L (ref 21–32)
COCAINE UR QL SCN: NEGATIVE
COLOR UR: YELLOW
CREAT SERPL-MCNC: 1.4 MG/DL (ref 0.7–1.3)
DIFFERENTIAL METHOD BLD: ABNORMAL
EGFR (NO RACE VARIABLE) (RUSH/TITUS): 72 ML/MIN/1.73M²
EOSINOPHIL # BLD AUTO: 0 K/UL (ref 0–0.5)
EOSINOPHIL NFR BLD AUTO: 0 % (ref 1–4)
ERYTHROCYTE [DISTWIDTH] IN BLOOD BY AUTOMATED COUNT: 13.9 % (ref 11.5–14.5)
ETHANOL, BLOOD (CATEGORY): NOT DETECTED
FLUAV AG UPPER RESP QL IA.RAPID: NEGATIVE
FLUBV AG UPPER RESP QL IA.RAPID: NEGATIVE
GLOBULIN SER-MCNC: 3.9 G/DL (ref 2–4)
GLUCOSE SERPL-MCNC: 104 MG/DL (ref 74–106)
GLUCOSE UR STRIP-MCNC: NEGATIVE MG/DL
HCT VFR BLD AUTO: 42.6 % (ref 40–54)
HGB BLD-MCNC: 15.1 G/DL (ref 13.5–18)
KETONES UR STRIP-SCNC: NEGATIVE MG/DL
LEUKOCYTE ESTERASE UR QL STRIP: NEGATIVE
LYMPHOCYTES # BLD AUTO: 1.65 K/UL (ref 1–4.8)
LYMPHOCYTES NFR BLD AUTO: 7.7 % (ref 27–41)
MCH RBC QN AUTO: 27.9 PG (ref 27–31)
MCHC RBC AUTO-ENTMCNC: 35.4 G/DL (ref 32–36)
MCV RBC AUTO: 78.7 FL (ref 80–96)
MONOCYTES # BLD AUTO: 1.26 K/UL (ref 0–0.8)
MONOCYTES NFR BLD AUTO: 5.9 % (ref 2–6)
MPC BLD CALC-MCNC: 10.8 FL (ref 9.4–12.4)
MUCOUS THREADS #/AREA URNS HPF: ABNORMAL /HPF
NEUTROPHILS # BLD AUTO: 18.39 K/UL (ref 1.8–7.7)
NEUTROPHILS NFR BLD AUTO: 86.3 % (ref 53–65)
NITRITE UR QL STRIP: NEGATIVE
OPIATES UR QL SCN: NEGATIVE
PCP UR QL SCN: NEGATIVE
PH UR STRIP: 5.5 PH UNITS
PLATELET # BLD AUTO: 247 K/UL (ref 150–400)
POTASSIUM SERPL-SCNC: 3.7 MMOL/L (ref 3.5–5.1)
PROT SERPL-MCNC: 8.1 G/DL (ref 6.4–8.2)
PROT UR QL STRIP: 30
RBC # BLD AUTO: 5.41 M/UL (ref 4.6–6.2)
RBC # UR STRIP: NEGATIVE /UL
RBC #/AREA URNS HPF: ABNORMAL /HPF
SARS-COV+SARS-COV-2 AG RESP QL IA.RAPID: NEGATIVE
SODIUM SERPL-SCNC: 142 MMOL/L (ref 136–145)
SP GR UR STRIP: 1.02
SQUAMOUS #/AREA URNS LPF: ABNORMAL /LPF
UROBILINOGEN UR STRIP-ACNC: 0.2 MG/DL
WBC # BLD AUTO: 21.32 K/UL (ref 4.5–11)
WBC #/AREA URNS HPF: ABNORMAL /HPF

## 2022-09-12 PROCEDURE — 80177 DRUG SCRN QUAN LEVETIRACETAM: CPT | Performed by: NURSE PRACTITIONER

## 2022-09-12 PROCEDURE — 99285 PR EMERGENCY DEPT VISIT,LEVEL V: ICD-10-PCS | Mod: ,,, | Performed by: NURSE PRACTITIONER

## 2022-09-12 PROCEDURE — 96374 THER/PROPH/DIAG INJ IV PUSH: CPT

## 2022-09-12 PROCEDURE — 82077 ASSAY SPEC XCP UR&BREATH IA: CPT | Performed by: NURSE PRACTITIONER

## 2022-09-12 PROCEDURE — 99285 EMERGENCY DEPT VISIT HI MDM: CPT | Mod: 25

## 2022-09-12 PROCEDURE — 96361 HYDRATE IV INFUSION ADD-ON: CPT

## 2022-09-12 PROCEDURE — 80053 COMPREHEN METABOLIC PANEL: CPT | Performed by: NURSE PRACTITIONER

## 2022-09-12 PROCEDURE — 80307 DRUG TEST PRSMV CHEM ANLYZR: CPT | Performed by: NURSE PRACTITIONER

## 2022-09-12 PROCEDURE — 96375 TX/PRO/DX INJ NEW DRUG ADDON: CPT

## 2022-09-12 PROCEDURE — 81001 URINALYSIS AUTO W/SCOPE: CPT | Performed by: NURSE PRACTITIONER

## 2022-09-12 PROCEDURE — 85025 COMPLETE CBC W/AUTO DIFF WBC: CPT | Performed by: NURSE PRACTITIONER

## 2022-09-12 PROCEDURE — 63600175 PHARM REV CODE 636 W HCPCS: Performed by: NURSE PRACTITIONER

## 2022-09-12 PROCEDURE — 36415 COLL VENOUS BLD VENIPUNCTURE: CPT | Performed by: NURSE PRACTITIONER

## 2022-09-12 PROCEDURE — 87428 SARSCOV & INF VIR A&B AG IA: CPT | Performed by: NURSE PRACTITIONER

## 2022-09-12 PROCEDURE — 25000003 PHARM REV CODE 250: Performed by: NURSE PRACTITIONER

## 2022-09-12 PROCEDURE — 82550 ASSAY OF CK (CPK): CPT | Performed by: NURSE PRACTITIONER

## 2022-09-12 PROCEDURE — 99285 EMERGENCY DEPT VISIT HI MDM: CPT | Mod: ,,, | Performed by: NURSE PRACTITIONER

## 2022-09-12 RX ORDER — LEVETIRACETAM 500 MG/5ML
1500 INJECTION, SOLUTION, CONCENTRATE INTRAVENOUS
Status: COMPLETED | OUTPATIENT
Start: 2022-09-12 | End: 2022-09-12

## 2022-09-12 RX ORDER — LEVETIRACETAM 500 MG/1
1000 TABLET ORAL 2 TIMES DAILY
Qty: 120 TABLET | Refills: 11 | Status: SHIPPED | OUTPATIENT
Start: 2022-09-12 | End: 2022-09-13 | Stop reason: SDUPTHER

## 2022-09-12 RX ORDER — KETOROLAC TROMETHAMINE 30 MG/ML
15 INJECTION, SOLUTION INTRAMUSCULAR; INTRAVENOUS
Status: COMPLETED | OUTPATIENT
Start: 2022-09-12 | End: 2022-09-12

## 2022-09-12 RX ADMIN — SODIUM CHLORIDE 1000 ML: 9 INJECTION, SOLUTION INTRAVENOUS at 04:09

## 2022-09-12 RX ADMIN — LEVETIRACETAM 1500 MG: 100 INJECTION INTRAVENOUS at 03:09

## 2022-09-12 RX ADMIN — KETOROLAC TROMETHAMINE 15 MG: 30 INJECTION, SOLUTION INTRAMUSCULAR; INTRAVENOUS at 05:09

## 2022-09-12 NOTE — ED PROVIDER NOTES
Encounter Date: 9/12/2022       History     Chief Complaint   Patient presents with    Seizures     Brought in for eval after a seizure. Patient and family report recent changes of his seizure medication     Presented per EMS with c/o seizure just PTA while at work.  has PMH of seizure and has just change meds from Topamax to keppra due to occurrence of renal stones.  was started on Keppra approx 2 mo ago. Take 500 mg po BID. Was evaluated by neuro 1 week after med change but has not returned for re-evaluation.  felt tired this am before going to work. 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 He fell to the ground. C/o headache, neck pain, lower back pain. Denies dizziness or focal weakness. Seizure was witnessed but does not know how long it lasted or how long he was postictal. Denies incontinence of B&B.    Review of patient's allergies indicates:   Allergen Reactions    Amoxicillin Other (See Comments)     Past Medical History:   Diagnosis Date    Attention deficit hyperactivity disorder     Bilateral low back pain 8/24/2022    Epilepsy, unspecified, intractable, without status epilepticus     Headache     Juvenile myoclonic epilepsy     Right renal stone 8/16/2022    KUB - unable to identify stone presence.  Will need CT if he becomes symptomatic.  Did not strain urine at work, but does not thing stone passed. Last significant renal colic 8/11, 0-9/10 on pain scale     History reviewed. No pertinent surgical history.  Family History   Problem Relation Age of Onset    Heart disease Other     Hypertension Other     Diabetes Other     Cancer Other         type unknown     Social History     Tobacco Use    Smoking status: Former     Types: Cigarettes    Smokeless tobacco: Current     Types: Snuff   Substance Use Topics    Alcohol use: Not Currently    Drug use: Never     Review of Systems   Constitutional: Negative.  Negative for fever.   HENT: Negative.     Respiratory: Negative.  Negative  for cough and shortness of breath.    Cardiovascular:  Negative for chest pain.   Gastrointestinal:  Negative for abdominal pain, diarrhea, nausea and vomiting.   Genitourinary:  Negative for difficulty urinating, flank pain, frequency, hematuria and urgency.   Musculoskeletal:  Positive for back pain and neck pain.   Skin: Negative.    Neurological:  Positive for seizures and headaches. Negative for dizziness, speech difficulty and weakness.   Psychiatric/Behavioral: Negative.       Physical Exam     Initial Vitals [09/12/22 1447]   BP Pulse Resp Temp SpO2   127/77 (!) 111 18 98 °F (36.7 °C) (!) 93 %      MAP       --         Physical Exam    Nursing note and vitals reviewed.  Constitutional: He appears well-developed and well-nourished. No distress.   HENT:   Head: Normocephalic.   Right Ear: External ear normal.   Left Ear: External ear normal.   Nose: Nose normal.   Mouth/Throat: Oropharynx is clear and moist.   Eyes: Conjunctivae and EOM are normal. Pupils are equal, round, and reactive to light.   Neck: Neck supple.   Normal range of motion.  Cardiovascular:  Normal rate, regular rhythm and normal heart sounds.           Pulmonary/Chest: Breath sounds normal. No respiratory distress. He has no wheezes. He has no rhonchi. He has no rales. He exhibits no tenderness.   Abdominal: Abdomen is soft. Bowel sounds are normal. He exhibits no distension. There is no abdominal tenderness. There is no rebound and no guarding.   Musculoskeletal:         General: No tenderness or edema. Normal range of motion.      Cervical back: Normal range of motion and neck supple.     Neurological: He is alert and oriented to person, place, and time. He has normal strength. GCS score is 15. GCS eye subscore is 4. GCS verbal subscore is 5. GCS motor subscore is 6.   Skin: Skin is warm and dry. Capillary refill takes less than 2 seconds.   Psychiatric: He has a normal mood and affect.       Medical Screening Exam   See Full Note    ED  Course   Procedures  Labs Reviewed   COMPREHENSIVE METABOLIC PANEL - Abnormal; Notable for the following components:       Result Value    Creatinine 1.40 (*)     All other components within normal limits   URINALYSIS, REFLEX TO URINE CULTURE - Abnormal; Notable for the following components:    Protein, UA 30 (*)     All other components within normal limits   CBC WITH DIFFERENTIAL - Abnormal; Notable for the following components:    WBC 21.32 (*)     MCV 78.7 (*)     Neutrophils % 86.3 (*)     Lymphocytes % 7.7 (*)     Neutrophils, Abs 18.39 (*)     Eosinophils % 0.0 (*)     Monocytes, Absolute 1.26 (*)     All other components within normal limits   URINALYSIS, MICROSCOPIC - Abnormal; Notable for the following components:    Squamous Epithelial Cells, UA Occasional (*)     Mucus, UA Few (*)     All other components within normal limits   DRUG SCREEN, URINE (BEAKER) - Normal    Narrative:     The results of screening tests should be considered presumptive. Confirmatory testing is available upon request.    Cutoff Points:  PCP:         25ng/mL  AMPH:        500ng/mL  SHAQ:        200ng/mL  JUANPABLO:        200ng/mL  THC:         50ng/mL  HAYDEE:         300ng/mL  OPI:         2000ng/mL   SARS-COV2 (COVID) W/ FLU ANTIGEN - Normal    Narrative:     Negative SARS-CoV results should not be used as the sole basis for treatment or patient management decisions; negative results should be considered in the context of a patient's recent exposures, history and the presene of clinical signs and symptoms consistent with COVID-19.  Negative results should be treated as presumptive and confirmed by molecular assay, if necessary for patient management.   CK - Normal   CBC W/ AUTO DIFFERENTIAL    Narrative:     The following orders were created for panel order CBC auto differential.  Procedure                               Abnormality         Status                     ---------                               -----------         ------                      CBC with Differential[[id number]]        Abnormal            Final result                 Please view results for these tests on the individual orders.   ALCOHOL,MEDICAL (ETHANOL)   LEVETIRACETAM  (KEPPRA) LEVEL          Imaging Results              CT Lumbar Spine Without Contrast (Final result)  Result time 09/12/22 16:00:33      Final result by Manuel Fuentes DO (09/12/22 16:00:33)                   Impression:      No convincing CT evidence of acute injury involving the osseous lumbar spine.    The CT exam was performed using one or more of the following dose    reduction techniques- Automated exposure control, adjustment of the mA    and/or kV according to patient size, and/or use of iterative    reconstructed technique.    Point of Service: [facility]      Electronically signed by: Manuel Fuentes  Date:    09/12/2022  Time:    16:00               Narrative:    EXAMINATION:  CT LUMBAR SPINE WITHOUT CONTRAST    CLINICAL HISTORY:  lower back pain after fall due to seizure;    COMPARISON:  None    TECHNIQUE:  Multiple axial tomographic images of the lumbar spine were obtained without the use of intravenous contrast. Coronal and sagittal reformatted images provided.    FINDINGS:  Lumbar vertebral body heights and alignment appear maintained.  No convincing evidence of significant lumbar spinal canal narrowing.                                       CT Cervical Spine Without Contrast (Final result)  Result time 09/12/22 16:03:00      Final result by Manuel Fuentes DO (09/12/22 16:03:00)                   Impression:      No convincing CT evidence of acute injury involving the osseous cervical spine.    The CT exam was performed using one or more of the following dose    reduction techniques- Automated exposure control, adjustment of the mA    and/or kV according to patient size, and/or use of iterative    reconstructed technique.    Point of Service: [facility]      Electronically signed by: Manuel Fuentes  Date:    09/12/2022  Time:    16:03               Narrative:    EXAMINATION:  CT CERVICAL SPINE WITHOUT CONTRAST    CLINICAL HISTORY:  neck pain after fall due to seizure;    COMPARISON:  None    TECHNIQUE:  Multiple axial tomographic images of the cervical spine were obtained without the use of intravenous contrast. Coronal and sagittal reformatted images provided.    FINDINGS:  There is straightening of normal cervical lordosis which may be positional or secondary to muscle spasm. There is no significant anterolisthesis or retrolisthesis.  Cervical vertebral body heights appear maintained.                                       CT Head Without Contrast (Final result)  Result time 09/12/22 15:58:57      Final result by Manuel Fuentes DO (09/12/22 15:58:57)                   Impression:      No convincing imaging evidence of acute intracranial abnormality.    The CT exam was performed using one or more of the following dose    reduction techniques- Automated exposure control, adjustment of the mA    and/or kV according to patient size, and/or use of iterative    reconstructed technique.    Point of Service: [facility]      Electronically signed by: Manuel Fuentes  Date:    09/12/2022  Time:    15:58               Narrative:    EXAMINATION:  CT HEAD WITHOUT CONTRAST    CLINICAL HISTORY:  headache after seizure with fall;    COMPARISON:  CT brain July 2, 2019    TECHNIQUE:  Multiple axial tomographic images of the brain were obtained without the use of intravenous contrast.    FINDINGS:  Midline structures are nondisplaced.  No convincing evidence of acute intracranial hemorrhage.  No convincing evidence of hydrocephalus.  Visualized paranasal sinuses and mastoid air cells are predominantly clear.                                       X-Ray Chest AP Portable (Final result)  Result time 09/12/22 16:01:04      Final result by Manuel Fuentes DO (09/12/22  16:01:04)                   Impression:      No acute cardiopulmonary process demonstrated.    Point of Service: [facility]      Electronically signed by: Manuel Fuentes  Date:    09/12/2022  Time:    16:01               Narrative:    EXAMINATION:  XR CHEST AP PORTABLE    CLINICAL HISTORY:  Chest Pain;    COMPARISON:  Chest x-ray April 28, 2022    TECHNIQUE:  Frontal view/views of the chest.    FINDINGS:  Heart size appears within normal limits.  No focal consolidation, pleural effusion, or pneumothorax.  Visualized osseous and surrounding soft tissue structures demonstrate no acute abnormality.                                    X-Rays:   Independently Interpreted Readings:   Chest X-Ray: :  No acute cardiopulmonary process demonstrated.    Head CT: No convincing imaging evidence of acute intracranial abnormality.    Other Readings:  CT c spine: No convincing CT evidence of acute injury involving the osseous cervical spine.    CT lumbar spine:  No convincing CT evidence of acute injury involving the osseous lumbar spine.   Medications   levETIRAcetam injection 1,500 mg (1,500 mg Intravenous Given 9/12/22 1558)   sodium chloride 0.9% bolus 1,000 mL (0 mLs Intravenous Stopped 9/12/22 1800)   ketorolac injection 15 mg (15 mg Intravenous Given 9/12/22 1709)     Medical Decision Making:   ED Management:  Discussed pt's case with LILLY Monahan NP Neuro. Agreed with Keppra load today and increase po Keppra to 1000 mg po BID starting tomorrow.     Labs show WBC 48176. No source of infection identified. Most likely due to seizure activity. . NS bolus given. Keppra 1500mg IVP given. CT head negative, CT c spine negative.     Discussed pts' case again with WENDY Monahan. He notes that patient is to follow-up with him tomorrow at 12:00 and EEG at 1300. Instructed pt on home care and follow-up. No seizure activity while in ED.                 Clinical Impression:   Final diagnoses:  [G40.919] Breakthrough seizure  (Primary)  [W19.XXXA] Fall, initial encounter  [S09.90XA] Injury of head, initial encounter  [D72.829] Leukocytosis, unspecified type      ED Disposition Condition    Discharge Stable          ED Prescriptions    None       Follow-up Information       Follow up With Specialties Details Why Contact Info    [facility], Emergency Medicine In 1 day at 12:00 pm [address] [place] [address] Neurology  [facility] [address]  [phone]               Venita Munoz NP  09/12/22 3710

## 2022-09-12 NOTE — DISCHARGE INSTRUCTIONS
Take Keppra 1000mg twice a day now. Follow-up with Rodrigo Monahan NP tomorrow at 12:00pm and plan on having EEG st 1:00 pm. Rest at home. Do not drive or operate any machinery. Drink plenty of liquids. Monitor your urine for darkening.

## 2022-09-13 ENCOUNTER — TELEPHONE (OUTPATIENT)
Dept: UROLOGY | Facility: CLINIC | Age: 27
End: 2022-09-13

## 2022-09-13 ENCOUNTER — PROCEDURE VISIT (OUTPATIENT)
Dept: NEUROLOGY | Facility: HOSPITAL | Age: 27
End: 2022-09-13
Attending: SOCIAL WORKER
Payer: COMMERCIAL

## 2022-09-13 ENCOUNTER — PATIENT MESSAGE (OUTPATIENT)
Dept: NEUROLOGY | Facility: CLINIC | Age: 27
End: 2022-09-13

## 2022-09-13 ENCOUNTER — OFFICE VISIT (OUTPATIENT)
Dept: NEUROLOGY | Facility: CLINIC | Age: 27
End: 2022-09-13
Payer: COMMERCIAL

## 2022-09-13 VITALS
BODY MASS INDEX: 38.3 KG/M2 | HEIGHT: 75 IN | SYSTOLIC BLOOD PRESSURE: 126 MMHG | HEART RATE: 69 BPM | DIASTOLIC BLOOD PRESSURE: 70 MMHG | OXYGEN SATURATION: 96 % | RESPIRATION RATE: 18 BRPM | WEIGHT: 308 LBS

## 2022-09-13 DIAGNOSIS — N20.0 RIGHT RENAL STONE: ICD-10-CM

## 2022-09-13 DIAGNOSIS — G40.309 GENERALIZED EPILEPSY: Primary | ICD-10-CM

## 2022-09-13 DIAGNOSIS — N20.1 LEFT URETERAL STONE: Primary | ICD-10-CM

## 2022-09-13 DIAGNOSIS — G40.309 GENERALIZED EPILEPSY: ICD-10-CM

## 2022-09-13 LAB — LEVETIRACETAM SERPL-MCNC: 31.9 ΜG/ML (ref 12–46)

## 2022-09-13 PROCEDURE — 95813 EEG EXTND MNTR 61-119 MIN: CPT

## 2022-09-13 PROCEDURE — 95813 PR EEG, EXTENDED, 61-119 MINS: ICD-10-PCS | Mod: 26,,, | Performed by: PSYCHIATRY & NEUROLOGY

## 2022-09-13 PROCEDURE — 99214 OFFICE O/P EST MOD 30 MIN: CPT | Mod: PBBFAC | Performed by: NURSE PRACTITIONER

## 2022-09-13 PROCEDURE — 95813 EEG EXTND MNTR 61-119 MIN: CPT | Mod: 26,,, | Performed by: PSYCHIATRY & NEUROLOGY

## 2022-09-13 PROCEDURE — 99213 PR OFFICE/OUTPT VISIT, EST, LEVL III, 20-29 MIN: ICD-10-PCS | Mod: S$PBB,,, | Performed by: NURSE PRACTITIONER

## 2022-09-13 PROCEDURE — 99213 OFFICE O/P EST LOW 20 MIN: CPT | Mod: S$PBB,,, | Performed by: NURSE PRACTITIONER

## 2022-09-13 RX ORDER — LEVETIRACETAM 500 MG/1
1000 TABLET ORAL 2 TIMES DAILY
Qty: 120 TABLET | Refills: 11 | Status: SHIPPED | OUTPATIENT
Start: 2022-09-13 | End: 2022-09-19

## 2022-09-13 NOTE — ADDENDUM NOTE
Encounter addended by: Hailey Martin on: 9/13/2022 6:50 PM   Actions taken: SmartForm saved, Flowsheet accepted

## 2022-09-13 NOTE — TELEPHONE ENCOUNTER
I called pt and he said in background that I can speak with his mother.  I told her his 3 week f/u appointment with WENDY Álvarez is set for wed. 9-28-22 at 2:30pm but needs CT scan abdomen/pelvis without contrast at 10:00am, nothing to eat or drink for 4 hours prior to CT.  Come on up to urology after CT Scan.  She voiced understanding.

## 2022-09-13 NOTE — PROGRESS NOTES
Subjective:       Patient ID: Rito Bowens is a 26 y.o. male     Chief Complaint:    Chief Complaint   Patient presents with    Follow-up    Seizures          Allergies:  Amoxicillin    Current Medications:    Outpatient Encounter Medications as of 9/13/2022   Medication Sig Dispense Refill    citalopram (CELEXA) 20 MG tablet Take 1 tablet (20 mg total) by mouth once daily. 90 tablet 3    ergocalciferol (ERGOCALCIFEROL) 50,000 unit Cap Take 1 capsule (50,000 Units total) by mouth every 30 days. 3 capsule 3    HYDROcodone-acetaminophen (NORCO) 5-325 mg per tablet Take 1 tablet by mouth every 4 (four) hours as needed for Pain. 10 tablet 0    levETIRAcetam (KEPPRA) 500 MG Tab Take 2 tablets (1,000 mg total) by mouth 2 (two) times daily. 120 tablet 11    montelukast (SINGULAIR) 10 mg tablet Take 1 tablet (10 mg total) by mouth every evening. 90 tablet 1    tamsulosin (FLOMAX) 0.4 mg Cap Take 1 capsule (0.4 mg total) by mouth once daily. for 10 doses 10 capsule 0    [DISCONTINUED] levETIRAcetam (KEPPRA) 500 MG Tab Take 1/2 tablet twice daily for a week than increase to 1 tablet twice daily after that 60 tablet 11    [DISCONTINUED] topiramate (TOPAMAX) 200 MG Tab Take 1 tablet (200 mg total) by mouth 2 (two) times daily. 180 tablet 3     Facility-Administered Encounter Medications as of 9/13/2022   Medication Dose Route Frequency Provider Last Rate Last Admin    [COMPLETED] ketorolac injection 15 mg  15 mg Intravenous ED 1 Time Venita Munoz NP   15 mg at 09/12/22 1709    [COMPLETED] levETIRAcetam injection 1,500 mg  1,500 mg Intravenous ED 1 Time Venita Munoz NP   1,500 mg at 09/12/22 1558    [COMPLETED] sodium chloride 0.9% bolus 1,000 mL  1,000 mL Intravenous ED 1 Time Venita Munoz NP   Stopped at 09/12/22 1800       History of Present Illness  25 y/o WM here today for f/u for primary generalized epilepsy.      At prior visit 2 months ago his last reported seizure was in July of 2019.  Prior treated with  Blu until his insurance no longer covered it and was changed to topamax 200mg BID and had been on this treatment about 18 months with no complications.  However, about 3 months ago was diagnosed with a kidney stone and I had discussed with him and family about need to find another treatment besides topamax to reduce possible recurrent kidney stones.  Patient was in agreement and started him on keppra 500mg BID, and started slow taper off the topamax.  I do note that 3 days after his last visit with me he was again seen in the ER with noted 4mm kidney stone so now x2, more reason to taper the topamax.  He tapered off the topamax last week.  I was called on yesterday, he had grand-mal seizure while at work.  Was seen in ED at [place].  He had CT head, cervical, and lumbar done with no apparent injury and no clear infection.  UDS negative.  Was given 1500mg keppra loading dose and I had told them to increase the keppra to 1000mg BID.  He is to have repeat EEG today. Last EEG in 2015 showed generalized epilepsy.  I also discussed with Dr. Marino.  Patient wanted to return to the topamax, but given now x2 kidney stones in 3 months this is bad idea.  Dr. Marino agreed with increase in keppra, may add topamax with it at no more than 50mg BID or add lamictal with keppra as another option if needed.    Originally he was treated with depakote but continued with seizures and was changed to topamax.         Review of Systems  Review of Systems   Constitutional:  Negative for diaphoresis and fever.   HENT:  Negative for congestion, hearing loss and tinnitus.    Eyes:  Negative for blurred vision, double vision, photophobia, discharge and redness.   Respiratory:  Negative for cough and shortness of breath.    Cardiovascular:  Negative for chest pain.   Gastrointestinal:  Negative for abdominal pain, nausea and vomiting.   Musculoskeletal:  Negative for back pain, joint pain, myalgias and neck pain.   Skin:  Negative for  itching and rash.   Neurological:  Positive for seizures. Negative for dizziness, tremors, sensory change, speech change, focal weakness, loss of consciousness, weakness and headaches.   Psychiatric/Behavioral:  Negative for depression, hallucinations and memory loss. The patient does not have insomnia.    All other systems reviewed and are negative.   Objective:     NEUROLOGICAL EXAMINATION:     MENTAL STATUS   Oriented to person, place, and time.   Registration: recalls 3 of 3 objects. Recall at 5 minutes: recalls 3 of 3 objects.   Attention: normal. Concentration: normal.   Speech: speech is normal   Level of consciousness: alert  Knowledge: good and consistent with education.   Normal comprehension.     CRANIAL NERVES     CN II   Visual fields full to confrontation.   Visual acuity: normal  Right visual field deficit: none  Left visual field deficit: none     CN III, IV, VI   Pupils are equal, round, and reactive to light.  Extraocular motions are normal.   Right pupil: Size: 3 mm. Shape: regular. Reactivity: brisk. Consensual response: intact. Accommodation: intact.   Left pupil: Size: 3 mm. Shape: regular. Reactivity: brisk. Consensual response: intact. Accommodation: intact.   CN III: no CN III palsy  CN VI: no CN VI palsy  Nystagmus: none   Diplopia: none  Upgaze: normal  Downgaze: normal  Conjugate gaze: present  Vestibulo-ocular reflex: present    CN V   Facial sensation intact.   Right facial sensation deficit: none  Left facial sensation deficit: none  Right corneal reflex: normal  Left corneal reflex: normal    CN VII   Facial expression full, symmetric.   Right facial weakness: none  Left facial weakness: none  Right taste: normal  Left taste: normal    CN VIII   CN VIII normal.   Hearing: intact    CN IX, X   CN IX normal.   CN X normal.   Palate: symmetric    CN XI   CN XI normal.   Right sternocleidomastoid strength: normal  Left sternocleidomastoid strength: normal  Right trapezius strength:  normal  Left trapezius strength: normal    CN XII   CN XII normal.   Tongue: not atrophic  Fasciculations: absent  Tongue deviation: none    MOTOR EXAM   Muscle bulk: normal  Overall muscle tone: normal  Right arm tone: normal  Left arm tone: normal  Right arm pronator drift: absent  Left arm pronator drift: absent  Right leg tone: normal  Left leg tone: normal    Strength   Right neck flexion: 5/5  Left neck flexion: 5/5  Right neck extension: 5/5  Left neck extension: 5/5  Right deltoid: 5/5  Left deltoid: 5/5  Right biceps: 5/5  Left biceps: 5/5  Right triceps: 5/5  Left triceps: 5/5  Right wrist flexion: 5/5  Left wrist flexion: 5/5  Right wrist extension: 5/5  Left wrist extension: 5/5  Right interossei: 5/5  Left interossei: 5/5  Right iliopsoas: 5/5  Left iliopsoas: 5/5  Right quadriceps: 5/5  Left quadriceps: 5/5  Right hamstrin/5  Left hamstrin/5  Right anterior tibial: 5/5  Left anterior tibial: 5/5  Right posterior tibial: 5/5  Left posterior tibial: 5/5  Right gastroc: 5/5  Left gastroc: 5/5    REFLEXES     Reflexes   Right brachioradialis: 2+  Left brachioradialis: 2+  Right biceps: 2+  Left biceps: 2+  Right triceps: 2+  Left triceps: 2+  Right patellar: 2+  Left patellar: 2+  Right achilles: 2+  Left achilles: 2+  Right plantar: normal  Left plantar: normal  Right Stark: absent  Left Stark: absent  Right ankle clonus: absent  Left ankle clonus: absent  Right pendular knee jerk: absent  Left pendular knee jerk: absent    SENSORY EXAM   Light touch normal.   Right arm light touch: normal  Left arm light touch: normal  Right leg light touch: normal  Left leg light touch: normal  Vibration normal.   Right arm vibration: normal  Left arm vibration: normal  Right leg vibration: normal  Left leg vibration: normal  Proprioception normal.   Right arm proprioception: normal  Left arm proprioception: normal  Right leg proprioception: normal  Left leg proprioception: normal  Pinprick normal.   Right  arm pinprick: normal  Left arm pinprick: normal  Right leg pinprick: normal  Left leg pinprick: normal  Graphesthesia: normal  Romberg: negative  Stereognosis: normal    GAIT AND COORDINATION     Gait  Gait: normal     Coordination   Finger to nose coordination: normal  Heel to shin coordination: normal  Tandem walking coordination: normal    Tremor   Resting tremor: absent  Intention tremor: absent  Action tremor: absent     Physical Exam  Vitals and nursing note reviewed.   Constitutional:       Appearance: Normal appearance.   HENT:      Head: Normocephalic.   Eyes:      Extraocular Movements: EOM normal.      Pupils: Pupils are equal, round, and reactive to light.   Cardiovascular:      Rate and Rhythm: Normal rate and regular rhythm.      Pulses: Normal pulses.      Heart sounds: Normal heart sounds.   Pulmonary:      Effort: Pulmonary effort is normal.      Breath sounds: Normal breath sounds.   Musculoskeletal:         General: Normal range of motion.      Cervical back: Normal range of motion and neck supple.   Skin:     General: Skin is warm and dry.   Neurological:      General: No focal deficit present.      Mental Status: He is alert and oriented to person, place, and time.      Cranial Nerves: No cranial nerve deficit.      Sensory: No sensory deficit.      Motor: No weakness.      Coordination: Coordination normal. Finger-Nose-Finger Test, Heel to Shin Test and Romberg Test normal.      Gait: Gait is intact. Gait and tandem walk normal.      Deep Tendon Reflexes: Reflexes normal.      Reflex Scores:       Tricep reflexes are 2+ on the right side and 2+ on the left side.       Bicep reflexes are 2+ on the right side and 2+ on the left side.       Brachioradialis reflexes are 2+ on the right side and 2+ on the left side.       Patellar reflexes are 2+ on the right side and 2+ on the left side.       Achilles reflexes are 2+ on the right side and 2+ on the left side.  Psychiatric:         Mood and Affect:  Mood normal.         Speech: Speech normal.         Behavior: Behavior normal.        Assessment:     Problem List Items Addressed This Visit          Neuro    Generalized epilepsy - Primary        Primary Diagnosis and ICD10  Generalized epilepsy [G40.309]    Plan:     Patient Instructions   Increase keppra to 1000mg twice daily  No driving for 6 months per state law  Notify clinic if any seizures      Seizure Precautions:  1. Take medications as directed  2. Avoid open flames and hot surfaces such as fires and grills  3. Avoid elevations such as ladders or stools  4. Avoid swimming alone  5. Make sure getting plenty of sleep, recommend 7-8 hours per day  6. Avoid alcohol and illicit drugs  7. No driving or operating heavy machinery for 6 months after last known seizure     There are no discontinued medications.    Requested Prescriptions      No prescriptions requested or ordered in this encounter       No orders of the defined types were placed in this encounter.

## 2022-09-13 NOTE — PATIENT INSTRUCTIONS
Increase keppra to 1000mg twice daily  No driving for 6 months per state law  Notify clinic if any seizures      Seizure Precautions:  1. Take medications as directed  2. Avoid open flames and hot surfaces such as fires and grills  3. Avoid elevations such as ladders or stools  4. Avoid swimming alone  5. Make sure getting plenty of sleep, recommend 7-8 hours per day  6. Avoid alcohol and illicit drugs  7. No driving or operating heavy machinery for 6 months after last known seizure

## 2022-09-13 NOTE — LETTER
September 13, 2022      [facility] - Neurology  [address] [place] [address] [place] [address]  Phone: [phone]  Fax: [phone]       Patient: Rito Bowens   YOB: 1995  Date of Visit: 09/13/2022    To Whom It May Concern:    Sagar Bowens  was at [facility] on 09/13/2022. The patient may return to work/school on 9/19/2022 with no restrictions. If you have any questions or concerns, or if I can be of further assistance, please do not hesitate to contact me.    Sincerely,    VANESSA Herrera

## 2022-09-17 ENCOUNTER — HOSPITAL ENCOUNTER (EMERGENCY)
Facility: HOSPITAL | Age: 27
Discharge: HOME | End: 2022-09-17
Payer: COMMERCIAL

## 2022-09-17 VITALS
DIASTOLIC BLOOD PRESSURE: 79 MMHG | SYSTOLIC BLOOD PRESSURE: 121 MMHG | TEMPERATURE: 98 F | HEIGHT: 75 IN | RESPIRATION RATE: 20 BRPM | HEART RATE: 85 BPM | OXYGEN SATURATION: 99 % | WEIGHT: 315 LBS | BODY MASS INDEX: 39.17 KG/M2

## 2022-09-17 DIAGNOSIS — R56.9 SEIZURE: Primary | ICD-10-CM

## 2022-09-17 LAB
ALBUMIN SERPL BCP-MCNC: 3.8 G/DL (ref 3.5–5)
ALBUMIN/GLOB SERPL: 0.9 {RATIO}
ALP SERPL-CCNC: 113 U/L (ref 45–115)
ALT SERPL W P-5'-P-CCNC: 57 U/L (ref 16–61)
ANION GAP SERPL CALCULATED.3IONS-SCNC: 13 MMOL/L (ref 7–16)
AST SERPL W P-5'-P-CCNC: 34 U/L (ref 15–37)
BASOPHILS # BLD AUTO: 0.03 K/UL (ref 0–0.2)
BASOPHILS NFR BLD AUTO: 0.2 % (ref 0–1)
BILIRUB SERPL-MCNC: 0.3 MG/DL (ref ?–1.2)
BUN SERPL-MCNC: 12 MG/DL (ref 7–18)
BUN/CREAT SERPL: 10 (ref 6–20)
CALCIUM SERPL-MCNC: 9.3 MG/DL (ref 8.5–10.1)
CHLORIDE SERPL-SCNC: 101 MMOL/L (ref 98–107)
CO2 SERPL-SCNC: 29 MMOL/L (ref 21–32)
CREAT SERPL-MCNC: 1.24 MG/DL (ref 0.7–1.3)
DIFFERENTIAL METHOD BLD: ABNORMAL
EGFR (NO RACE VARIABLE) (RUSH/TITUS): 83 ML/MIN/1.73M²
EOSINOPHIL # BLD AUTO: 0.29 K/UL (ref 0–0.5)
EOSINOPHIL NFR BLD AUTO: 2.1 % (ref 1–4)
EOSINOPHIL NFR BLD MANUAL: 3 % (ref 1–4)
ERYTHROCYTE [DISTWIDTH] IN BLOOD BY AUTOMATED COUNT: 14 % (ref 11.5–14.5)
GLOBULIN SER-MCNC: 4.2 G/DL (ref 2–4)
GLUCOSE SERPL-MCNC: 106 MG/DL (ref 74–106)
HCT VFR BLD AUTO: 44.4 % (ref 40–54)
HGB BLD-MCNC: 15.6 G/DL (ref 13.5–18)
LYMPHOCYTES # BLD AUTO: 2.35 K/UL (ref 1–4.8)
LYMPHOCYTES NFR BLD AUTO: 17.3 % (ref 27–41)
LYMPHOCYTES NFR BLD MANUAL: 19 % (ref 27–41)
MCH RBC QN AUTO: 27.7 PG (ref 27–31)
MCHC RBC AUTO-ENTMCNC: 35.1 G/DL (ref 32–36)
MCV RBC AUTO: 78.9 FL (ref 80–96)
MONOCYTES # BLD AUTO: 0.89 K/UL (ref 0–0.8)
MONOCYTES NFR BLD AUTO: 6.5 % (ref 2–6)
MONOCYTES NFR BLD MANUAL: 7 % (ref 2–6)
MPC BLD CALC-MCNC: 10.8 FL (ref 9.4–12.4)
NEUTROPHILS # BLD AUTO: 10.06 K/UL (ref 1.8–7.7)
NEUTROPHILS NFR BLD AUTO: 73.9 % (ref 53–65)
NEUTS BAND NFR BLD MANUAL: 5 % (ref 1–5)
NEUTS SEG NFR BLD MANUAL: 66 % (ref 50–62)
NRBC BLD MANUAL-RTO: ABNORMAL %
PLATELET # BLD AUTO: 239 K/UL (ref 150–400)
PLATELET MORPHOLOGY: NORMAL
POTASSIUM SERPL-SCNC: 3.9 MMOL/L (ref 3.5–5.1)
PROT SERPL-MCNC: 8 G/DL (ref 6.4–8.2)
RAPID GROUP A STREP: NEGATIVE
RBC # BLD AUTO: 5.63 M/UL (ref 4.6–6.2)
RBC MORPH BLD: NORMAL
SODIUM SERPL-SCNC: 139 MMOL/L (ref 136–145)
WBC # BLD AUTO: 13.62 K/UL (ref 4.5–11)

## 2022-09-17 PROCEDURE — 85025 COMPLETE CBC W/AUTO DIFF WBC: CPT | Performed by: NURSE PRACTITIONER

## 2022-09-17 PROCEDURE — 87880 STREP A ASSAY W/OPTIC: CPT | Performed by: NURSE PRACTITIONER

## 2022-09-17 PROCEDURE — 25000003 PHARM REV CODE 250: Performed by: NURSE PRACTITIONER

## 2022-09-17 PROCEDURE — 99284 PR EMERGENCY DEPT VISIT,LEVEL IV: ICD-10-PCS | Mod: ,,, | Performed by: NURSE PRACTITIONER

## 2022-09-17 PROCEDURE — 87081 CULTURE SCREEN ONLY: CPT | Performed by: NURSE PRACTITIONER

## 2022-09-17 PROCEDURE — 99284 EMERGENCY DEPT VISIT MOD MDM: CPT | Mod: ,,, | Performed by: NURSE PRACTITIONER

## 2022-09-17 PROCEDURE — 63600175 PHARM REV CODE 636 W HCPCS: Performed by: NURSE PRACTITIONER

## 2022-09-17 PROCEDURE — 36415 COLL VENOUS BLD VENIPUNCTURE: CPT | Performed by: NURSE PRACTITIONER

## 2022-09-17 PROCEDURE — 80177 DRUG SCRN QUAN LEVETIRACETAM: CPT | Performed by: NURSE PRACTITIONER

## 2022-09-17 PROCEDURE — 96374 THER/PROPH/DIAG INJ IV PUSH: CPT

## 2022-09-17 PROCEDURE — 99284 EMERGENCY DEPT VISIT MOD MDM: CPT | Mod: 25

## 2022-09-17 PROCEDURE — 80053 COMPREHEN METABOLIC PANEL: CPT | Performed by: NURSE PRACTITIONER

## 2022-09-17 RX ORDER — KETOROLAC TROMETHAMINE 30 MG/ML
15 INJECTION, SOLUTION INTRAMUSCULAR; INTRAVENOUS
Status: COMPLETED | OUTPATIENT
Start: 2022-09-17 | End: 2022-09-17

## 2022-09-17 RX ORDER — TOPIRAMATE 50 MG/1
50 TABLET, FILM COATED ORAL 2 TIMES DAILY
Qty: 60 TABLET | Refills: 0 | Status: SHIPPED | OUTPATIENT
Start: 2022-09-17 | End: 2022-10-17 | Stop reason: SDUPTHER

## 2022-09-17 RX ORDER — TOPIRAMATE 25 MG/1
50 TABLET ORAL
Status: COMPLETED | OUTPATIENT
Start: 2022-09-17 | End: 2022-09-17

## 2022-09-17 RX ADMIN — TOPIRAMATE 50 MG: 25 TABLET, FILM COATED ORAL at 06:09

## 2022-09-17 RX ADMIN — KETOROLAC TROMETHAMINE 15 MG: 30 INJECTION, SOLUTION INTRAMUSCULAR; INTRAVENOUS at 06:09

## 2022-09-17 NOTE — Clinical Note
"Rito Kim" Gogo was seen and treated in our emergency department on 9/17/2022.  He may return to work on 09/20/2022.       If you have any questions or concerns, please don't hesitate to call.      VANESSA Elizalde"

## 2022-09-17 NOTE — ED PROVIDER NOTES
"Encounter Date: 9/17/2022       History     Chief Complaint   Patient presents with    Seizures     26 year old  male presents to the ER for seizure.  Patient has past medical history of seizures, and he is followed by neurology Shay NP at [facility].  Pt reports he was seen here in our ER on Monday after having seizure.  He originally was on Topamax and was changed over to Keppra 500mg BID in June or July of this year.  Patient had his first seizure on Monday.  They spoke with Shay NP, who increased the patient's Keppra to 1000mg PO BID, he got loading dose of 1500mg in the ER.  His Keppra level was normal 31.9 during that ER visit.  He presented to the ER today VIA EMS after having another seizure at home.  Patient's seizure was witnessed by the step-father, who reported he was sitting on the couch when it occurred.  He describes "jerking" all over.  He reports it lasted about 10 minutes.  Pt has bite to right side of the tongue.  Patient did not have urinary or fecal incontinence.  Pt reports he started the Keppra 1000MG BID on Monday.  He reports since then, the only complaint he has had was a sore throat, which started yesterday.  Denies fever, body aches, HA, fever, chills, sweats, cough, SOB. Pt saw his neurology NP on 9/13/22 for follow-up.  Pt had EEG done in the clinic, I am unable to see those results.  Pt reports he has been sleeping well throughout the night and he denies increased stress or lack of sleep. Pt does not remember any symptoms prior to the seizure starting.      The history is provided by the patient, a relative, medical records, the EMS personnel and a parent.   Seizures   This is a recurrent problem. The current episode started just prior to arrival. The problem has been resolved. There was 1 seizure. The most recent episode lasted More than 5 minutes. Associated symptoms include sore throat. Pertinent negatives include no sleepiness, no confusion, no headaches, no " speech difficulty, no visual disturbance, no neck stiffness, no chest pain, no cough, no nausea, no vomiting, no diarrhea and no muscle weakness. Characteristics include rhythmic jerking, loss of consciousness and bit tongue. Characteristics do not include bowel incontinence, bladder incontinence, apnea or cyanosis. The episode was Witnessed. There was No sensation of an aura present. The seizures Did not continue in the ED. The seizure(s) had no focality. Possible causes include medication or dosage change (increased Keppra) and recent illness (sore throat). Possible causes do not include sleep deprivation, missed seizure meds or change in alcohol use. There were no medications administered prior to arrival.   Review of patient's allergies indicates:   Allergen Reactions    Amoxicillin Other (See Comments)     Past Medical History:   Diagnosis Date    Attention deficit hyperactivity disorder     Bilateral low back pain 8/24/2022    Epilepsy, unspecified, intractable, without status epilepticus     Headache     Juvenile myoclonic epilepsy     Right renal stone 8/16/2022    KUB - unable to identify stone presence.  Will need CT if he becomes symptomatic.  Did not strain urine at work, but does not thing stone passed. Last significant renal colic 8/11, 0-9/10 on pain scale     History reviewed. No pertinent surgical history.  Family History   Problem Relation Age of Onset    Heart disease Other     Hypertension Other     Diabetes Other     Cancer Other         type unknown     Social History     Tobacco Use    Smoking status: Former     Types: Cigarettes    Smokeless tobacco: Current     Types: Snuff   Substance Use Topics    Alcohol use: Not Currently    Drug use: Never     Review of Systems   Constitutional:  Negative for chills, diaphoresis, fatigue and fever.   HENT:  Positive for sore throat.    Eyes:  Negative for visual disturbance.   Respiratory:  Negative for apnea and cough.    Cardiovascular:  Negative for  chest pain and cyanosis.   Gastrointestinal:  Negative for bowel incontinence, diarrhea, nausea and vomiting.   Genitourinary:  Negative for bladder incontinence.   Skin:  Positive for wound.   Neurological:  Positive for seizures and loss of consciousness. Negative for dizziness, tremors, syncope, facial asymmetry, speech difficulty, weakness, light-headedness, numbness and headaches.   Psychiatric/Behavioral:  Negative for confusion.      Physical Exam     Initial Vitals [09/17/22 1640]   BP Pulse Resp Temp SpO2   118/75 104 20 98.1 °F (36.7 °C) 98 %      MAP       --         Physical Exam    Nursing note and vitals reviewed.  Constitutional: He appears well-developed and well-nourished. He is not diaphoretic. He is Obese . He is cooperative.  Non-toxic appearance. He has a sickly appearance. No distress.   HENT:   Head: Normocephalic and atraumatic.   Mouth/Throat: Uvula is midline and mucous membranes are normal. Mucous membranes are not dry. Abnormal dentition. Dental caries present. No dental abscesses. Posterior oropharyngeal erythema present. No oropharyngeal exudate, posterior oropharyngeal edema or tonsillar abscesses.       Eyes: Pupils are equal, round, and reactive to light.   Neck: Neck supple.   Cardiovascular:  Normal rate, regular rhythm, normal heart sounds and intact distal pulses.     Exam reveals no gallop and no friction rub.       No murmur heard.  Pulmonary/Chest: Breath sounds normal. No respiratory distress. He has no wheezes. He has no rhonchi. He has no rales. He exhibits no tenderness.   Musculoskeletal:      Cervical back: Neck supple.     Neurological: He is alert and oriented to person, place, and time. He has normal strength. He displays no atrophy and no tremor. No cranial nerve deficit or sensory deficit. He exhibits normal muscle tone. He displays no seizure activity. GCS score is 15. GCS eye subscore is 4. GCS verbal subscore is 5. GCS motor subscore is 6.   Skin: Skin is warm  and dry. Capillary refill takes less than 2 seconds.   Psychiatric: He has a normal mood and affect.       Medical Screening Exam   See Full Note    ED Course   Procedures  Labs Reviewed   COMPREHENSIVE METABOLIC PANEL - Abnormal; Notable for the following components:       Result Value    Globulin 4.2 (*)     All other components within normal limits   CBC WITH DIFFERENTIAL - Abnormal; Notable for the following components:    WBC 13.62 (*)     MCV 78.9 (*)     Neutrophils % 73.9 (*)     Lymphocytes % 17.3 (*)     Neutrophils, Abs 10.06 (*)     Monocytes % 6.5 (*)     Monocytes, Absolute 0.89 (*)     All other components within normal limits   MANUAL DIFFERENTIAL - Abnormal; Notable for the following components:    Segmented Neutrophils, Man % 66 (*)     Lymphocytes, Man % 19 (*)     Monocytes, Man % 7 (*)     All other components within normal limits   THROAT SCREEN, RAPID STREP - Normal   CULTURE, STREP A,  THROAT   CBC W/ AUTO DIFFERENTIAL    Narrative:     The following orders were created for panel order CBC auto differential.  Procedure                               Abnormality         Status                     ---------                               -----------         ------                     CBC with Differential[[id number]]        Abnormal            Final result               Manual Differential[[id number]]          Abnormal            Final result                 Please view results for these tests on the individual orders.   LEVETIRACETAM  (KEPPRA) LEVEL          Imaging Results    None          Medications   ketorolac injection 15 mg (has no administration in time range)   topiramate tablet 50 mg (has no administration in time range)                 ED Course as of 09/17/22 1837   Sat Sep 17, 2022   1806 WBC(!): 13.62 [AG]   1815 Patient reports he is having back pain after his seizure and is requesting pain meds.  Pt reports when he has seizures that he always has back pain.  Step-father denies  patient falling off the couch during the seizure.  [AG]   1830 Spoke with Abbe Monahan NP with Neuro at [facility].  He spoke with Dr Marino (neurologist).  The plan is to leave patient on the Keppra 1000mg PO BID.  Patient will restart the Topamax, taking 50mg PO BID. Abbe Monahan NP will call the patient on Monday to check on him.  Pt will not drive for 6 months.  [AG]      ED Course User Index  [AG] VANESSA Elizalde          Clinical Impression:   Final diagnoses:  [R56.9] Seizure (Primary)      ED Disposition Condition    Discharge Stable          ED Prescriptions       Medication Sig Dispense Start Date End Date Auth. Provider    topiramate (TOPAMAX) 50 MG tablet Take 1 tablet (50 mg total) by mouth 2 (two) times daily. 60 tablet 9/17/2022 10/17/2022 VANESSA Elizalde          Follow-up Information       Follow up With Specialties Details Why Contact Info    VANESSA Herrera Neurology, Emergency Medicine  He will call you Monday to check on you [address]  [facility] Neurology  [facility] [address]  [phone]               VANESSA Elizalde  09/17/22 1445

## 2022-09-17 NOTE — DISCHARGE INSTRUCTIONS
Abbe Monahan NP will call you Monday to check on you.  Continue taking Keppra 1,000mg by mouth twice daily.  You will restart taking your Topamax 50mg by mouth twice daily.  Do not drive for 6 months and cleared by neurology.  Do no take baths or swim alone.  Return for recurrent seizures. Rest and drink plenty of water.

## 2022-09-19 DIAGNOSIS — G40.309 GENERALIZED EPILEPSY: ICD-10-CM

## 2022-09-19 LAB — LEVETIRACETAM SERPL-MCNC: 11.2 ΜG/ML (ref 12–46)

## 2022-09-19 RX ORDER — LEVETIRACETAM 500 MG/1
1500 TABLET ORAL 2 TIMES DAILY
Qty: 180 TABLET | Refills: 11 | Status: SHIPPED | OUTPATIENT
Start: 2022-09-19 | End: 2023-08-03 | Stop reason: SDUPTHER

## 2022-09-20 ENCOUNTER — TELEPHONE (OUTPATIENT)
Dept: UROLOGY | Facility: CLINIC | Age: 27
End: 2022-09-20

## 2022-09-20 ENCOUNTER — PATIENT MESSAGE (OUTPATIENT)
Dept: NEUROLOGY | Facility: CLINIC | Age: 27
End: 2022-09-20

## 2022-09-20 LAB — DEPRECATED S PYO AG THROAT QL EIA: NORMAL

## 2022-09-20 NOTE — TELEPHONE ENCOUNTER
----- Message from Deyanira Álvarez sent at 9/19/2022  9:57 AM CDT -----  Regarding: reschedule ct  Pt calling to reschedule ct and appt that is scheduled for 9/28. Pt call back # [phone]  I called pt and spoke with mother.  She said pt had 2 seizures this week and had to miss work, so they want to reschedule his f/u with WENDY Álvarez and also the CT Scan that is the end of this month.  I told her I will see what I can do and will call her back.  She voiced understanding.

## 2022-09-20 NOTE — TELEPHONE ENCOUNTER
----- Message from Mary Murillo sent at 9/19/2022 10:05 AM CDT -----  Regarding: FW: reschedule ct    ----- Message -----  From: Deyanira Álvarez  Sent: 9/19/2022   9:59 AM CDT  To: [facility]ier Staff  Subject: reschedule ct                                    Pt calling to reschedule ct and appt that is scheduled for 9/28. Pt call back # [phone]  I called mother back and informed her that we have his CT Scan scheduled for Oct 26, 2022 at 2:30pm and scheduled with WENDY Álvarez same date and time, but he needs to have CT done first, then come up to urology and we will be expecting him.  Tell him nothing to eat or drink for about 4 hours prior to his CT.  She voiced understanding.

## 2022-09-26 ENCOUNTER — PATIENT MESSAGE (OUTPATIENT)
Dept: NEUROLOGY | Facility: CLINIC | Age: 27
End: 2022-09-26

## 2022-09-29 ENCOUNTER — HOSPITAL ENCOUNTER (EMERGENCY)
Facility: HOSPITAL | Age: 27
Discharge: HOME | End: 2022-09-29
Payer: COMMERCIAL

## 2022-09-29 ENCOUNTER — HOSPITAL ENCOUNTER (OUTPATIENT)
Dept: RADIOLOGY | Facility: HOSPITAL | Age: 27
Discharge: HOME | End: 2022-09-29
Attending: NURSE PRACTITIONER
Payer: COMMERCIAL

## 2022-09-29 VITALS
DIASTOLIC BLOOD PRESSURE: 66 MMHG | HEART RATE: 96 BPM | RESPIRATION RATE: 18 BRPM | HEIGHT: 75 IN | BODY MASS INDEX: 39.17 KG/M2 | TEMPERATURE: 98 F | SYSTOLIC BLOOD PRESSURE: 117 MMHG | OXYGEN SATURATION: 98 % | WEIGHT: 315 LBS

## 2022-09-29 DIAGNOSIS — M54.9 BACK PAIN: ICD-10-CM

## 2022-09-29 DIAGNOSIS — M54.9 BACK PAIN: Primary | ICD-10-CM

## 2022-09-29 DIAGNOSIS — M54.50 ACUTE MIDLINE LOW BACK PAIN WITHOUT SCIATICA: Primary | ICD-10-CM

## 2022-09-29 PROCEDURE — 72070 X-RAY EXAM THORAC SPINE 2VWS: CPT | Mod: TC

## 2022-09-29 PROCEDURE — 72100 X-RAY EXAM L-S SPINE 2/3 VWS: CPT | Mod: TC

## 2022-09-29 PROCEDURE — 99283 PR EMERGENCY DEPT VISIT,LEVEL III: ICD-10-PCS | Mod: ,,, | Performed by: NURSE PRACTITIONER

## 2022-09-29 PROCEDURE — 99283 EMERGENCY DEPT VISIT LOW MDM: CPT | Mod: ,,, | Performed by: NURSE PRACTITIONER

## 2022-09-29 PROCEDURE — 99281 EMR DPT VST MAYX REQ PHY/QHP: CPT

## 2022-09-29 NOTE — DISCHARGE INSTRUCTIONS
As discussed, your condition does not require emergency treatment at this time. Go to Office of W[facility] Copeland upon leaving the ED for further treatment since they are already treating this condition. If your condition worsens, return to the ED for re-evaluation.

## 2022-09-29 NOTE — ED PROVIDER NOTES
Encounter Date: 9/29/2022       History     Chief Complaint   Patient presents with    Back Pain     Since 9/17     Presented with c/o lower back pain that started after fall due to seizure on 9/12. Was evaluated in the ED that day. CT head, cervical, lumbar spine were all normal following injury. Has been evaluated by chiropractor, had xray that was negative, and had TENS treatment then. Was evaluated by PCP at office of [facility] and was given Toradol for 5 days that helped his pain. Waiting on insurance approval for another CT. Denies re-injury, incontinence of B&B or leg weakness or radiation of pain to legs. States pain is not worse but not just not getting better.    Review of patient's allergies indicates:   Allergen Reactions    Amoxicillin Other (See Comments)     Past Medical History:   Diagnosis Date    Attention deficit hyperactivity disorder     Bilateral low back pain 8/24/2022    Epilepsy, unspecified, intractable, without status epilepticus     Headache     Juvenile myoclonic epilepsy     Right renal stone 8/16/2022    KUB - unable to identify stone presence.  Will need CT if he becomes symptomatic.  Did not strain urine at work, but does not thing stone passed. Last significant renal colic 8/11, 0-9/10 on pain scale     History reviewed. No pertinent surgical history.  Family History   Problem Relation Age of Onset    Heart disease Other     Hypertension Other     Diabetes Other     Cancer Other         type unknown     Social History     Tobacco Use    Smoking status: Former     Types: Cigarettes    Smokeless tobacco: Current     Types: Snuff   Substance Use Topics    Alcohol use: Not Currently    Drug use: Never     Review of Systems   Constitutional:  Positive for activity change. Negative for fever.   HENT: Negative.     Respiratory: Negative.     Cardiovascular: Negative.    Gastrointestinal: Negative.    Genitourinary: Negative.  Negative for decreased urine volume, difficulty urinating,  hematuria and urgency.   Musculoskeletal:  Positive for back pain.   Skin: Negative.    Neurological: Negative.    Psychiatric/Behavioral: Negative.       Physical Exam     Initial Vitals [09/29/22 0904]   BP Pulse Resp Temp SpO2   129/66 90 20 98.3 °F (36.8 °C) 97 %      MAP       --         Physical Exam    Constitutional: He appears well-developed and well-nourished. He appears distressed.   HENT:   Head: Normocephalic.   Right Ear: External ear normal.   Left Ear: External ear normal.   Eyes: EOM are normal. Pupils are equal, round, and reactive to light.   Neck: Neck supple.   Normal range of motion.  Cardiovascular:  Normal rate, regular rhythm, normal heart sounds and intact distal pulses.           Pulmonary/Chest: Breath sounds normal.   Abdominal: Abdomen is soft. Bowel sounds are normal.   Musculoskeletal:         General: Tenderness present. Normal range of motion.      Cervical back: Normal range of motion and neck supple.        Back:      Neurological: He is alert and oriented to person, place, and time. He has normal strength. GCS score is 15. GCS eye subscore is 4. GCS verbal subscore is 5. GCS motor subscore is 6.   Skin: Skin is warm and dry. Capillary refill takes less than 2 seconds.   Psychiatric: He has a normal mood and affect.       Medical Screening Exam   See Full Note    ED Course   Procedures  Labs Reviewed - No data to display       Imaging Results    None          Medications - No data to display  Medical Decision Making:   ED Management:  Instructed that condition does not warrant emergency care at this time. Sent to his PCP for further treatment. Instructed to return to the ED if condition worsens or new symptoms appear.                   Clinical Impression:   Final diagnoses:  [M54.50] Acute midline low back pain without sciatica (Primary)        ED Disposition Condition    Discharge Stable          ED Prescriptions    None       Follow-up Information       Follow up With  Specialties Details Why Contact Info    David Copeland NP Gerontology, Family Medicine, Emergency Medicine Today For continued care [address] [place] [id number]  [phone]               Venita Munoz NP  09/29/22 0919

## 2022-09-29 NOTE — ED TRIAGE NOTES
Lower and mid back pain since 9/17 after he had a seizure, has had toradol that helped but is now out, no other symptoms

## 2022-09-30 ENCOUNTER — TELEPHONE (OUTPATIENT)
Dept: NEUROLOGY | Facility: CLINIC | Age: 27
End: 2022-09-30

## 2022-09-30 DIAGNOSIS — S22.080A COMPRESSION FRACTURE OF T12 VERTEBRA, INITIAL ENCOUNTER: Primary | ICD-10-CM

## 2022-09-30 NOTE — TELEPHONE ENCOUNTER
Called pt's mother left message appointment for CT T spine on 10-7-22 at 1:00 at [facility].            ----- Message from VANESSA Herrera sent at 9/30/2022  9:55 AM CDT -----  I put in order, just when you call them with the date/time let them know I looked at the report from the Xr done in the ER  ----- Message -----  From: Bethany Albert LPN  Sent: 9/30/2022   8:10 AM CDT  To: VANESSA Herrera    I CAN'T SEE WHERE ONE WAS ORDER  ----- Message -----  From: VANESSA Herrera  Sent: 9/30/2022   7:45 AM CDT  To: Bethany Albert LPN    Apparently this was done in the ER, questioning a T12 fracture, he had prior CT lumbar but I cant see T12, he needs CT thoracic but the ED record gave impression they were pending insurance approval for a CT.  Need to know if this is being ordered already, if not I need ot put in order for it.

## 2022-10-07 ENCOUNTER — HOSPITAL ENCOUNTER (OUTPATIENT)
Dept: RADIOLOGY | Facility: HOSPITAL | Age: 27
Discharge: HOME | End: 2022-10-07
Attending: NURSE PRACTITIONER
Payer: COMMERCIAL

## 2022-10-07 DIAGNOSIS — S22.080A COMPRESSION FRACTURE OF T12 VERTEBRA, INITIAL ENCOUNTER: ICD-10-CM

## 2022-10-07 PROCEDURE — 72128 CT CHEST SPINE W/O DYE: CPT | Mod: 26,,, | Performed by: RADIOLOGY

## 2022-10-07 PROCEDURE — 72128 CT CHEST SPINE W/O DYE: CPT | Mod: TC

## 2022-10-07 PROCEDURE — 72128 CT THORACIC SPINE WITHOUT CONTRAST: ICD-10-PCS | Mod: 26,,, | Performed by: RADIOLOGY

## 2022-10-12 ENCOUNTER — OFFICE VISIT (OUTPATIENT)
Dept: NEUROLOGY | Facility: CLINIC | Age: 27
End: 2022-10-12
Payer: COMMERCIAL

## 2022-10-12 VITALS
DIASTOLIC BLOOD PRESSURE: 70 MMHG | HEIGHT: 75 IN | HEART RATE: 89 BPM | RESPIRATION RATE: 18 BRPM | SYSTOLIC BLOOD PRESSURE: 110 MMHG | WEIGHT: 315 LBS | BODY MASS INDEX: 39.17 KG/M2 | OXYGEN SATURATION: 99 %

## 2022-10-12 DIAGNOSIS — M62.838 MUSCLE SPASM: ICD-10-CM

## 2022-10-12 DIAGNOSIS — G40.309 GENERALIZED EPILEPSY: Primary | ICD-10-CM

## 2022-10-12 DIAGNOSIS — S22.088A OTHER CLOSED FRACTURE OF TWELFTH THORACIC VERTEBRA, INITIAL ENCOUNTER: ICD-10-CM

## 2022-10-12 PROBLEM — S22.089A CLOSED FRACTURE OF TWELFTH THORACIC VERTEBRA: Status: ACTIVE | Noted: 2022-10-12

## 2022-10-12 PROCEDURE — 99213 OFFICE O/P EST LOW 20 MIN: CPT | Mod: S$PBB,,, | Performed by: NURSE PRACTITIONER

## 2022-10-12 PROCEDURE — 99215 OFFICE O/P EST HI 40 MIN: CPT | Mod: PBBFAC | Performed by: NURSE PRACTITIONER

## 2022-10-12 PROCEDURE — 99213 PR OFFICE/OUTPT VISIT, EST, LEVL III, 20-29 MIN: ICD-10-PCS | Mod: S$PBB,,, | Performed by: NURSE PRACTITIONER

## 2022-10-12 RX ORDER — TIZANIDINE 4 MG/1
4 TABLET ORAL EVERY 8 HOURS PRN
Qty: 30 TABLET | Refills: 1 | Status: SHIPPED | OUTPATIENT
Start: 2022-10-12 | End: 2022-10-22

## 2022-10-12 RX ORDER — MELOXICAM 15 MG/1
15 TABLET ORAL DAILY
Qty: 30 TABLET | Refills: 3 | Status: SHIPPED | OUTPATIENT
Start: 2022-10-12

## 2022-10-12 NOTE — PATIENT INSTRUCTIONS
Continue keppra 1500mg twice daily  No driving for 6 months per state law  Continue current topamax 50mg twice daily  Refer to Dr. Cristino Álvarez for thoracic spinal fractures  Zanaflex 4mg every 8 hours as needed for muscle spasms  Mobic 15mg daily as needed for lower back pain      Seizure Precautions:  1. Take medications as directed  2. Avoid open flames and hot surfaces such as fires and grills  3. Avoid elevations such as ladders or stools  4. Avoid swimming alone  5. Make sure getting plenty of sleep, recommend 7-8 hours per day  6. Avoid alcohol and illicit drugs  7. No driving or operating heavy machinery for 6 months after last known seizure

## 2022-10-12 NOTE — PROGRESS NOTES
Subjective:       Patient ID: Rito Bowens is a 26 y.o. male     Chief Complaint:    Chief Complaint   Patient presents with    Follow-up    Seizures            Allergies:  Amoxicillin    Current Medications:    Outpatient Encounter Medications as of 10/12/2022   Medication Sig Dispense Refill    citalopram (CELEXA) 20 MG tablet Take 1 tablet (20 mg total) by mouth once daily. 90 tablet 3    ergocalciferol (ERGOCALCIFEROL) 50,000 unit Cap Take 1 capsule (50,000 Units total) by mouth every 30 days. 3 capsule 3    levETIRAcetam (KEPPRA) 500 MG Tab Take 3 tablets (1,500 mg total) by mouth 2 (two) times daily. 180 tablet 11    montelukast (SINGULAIR) 10 mg tablet Take 1 tablet (10 mg total) by mouth every evening. 90 tablet 1    topiramate (TOPAMAX) 50 MG tablet Take 1 tablet (50 mg total) by mouth 2 (two) times daily. 60 tablet 0    HYDROcodone-acetaminophen (NORCO) 5-325 mg per tablet Take 1 tablet by mouth every 4 (four) hours as needed for Pain. (Patient not taking: Reported on 10/12/2022) 10 tablet 0    meloxicam (MOBIC) 15 MG tablet Take 1 tablet (15 mg total) by mouth once daily. 30 tablet 3    tamsulosin (FLOMAX) 0.4 mg Cap Take 1 capsule (0.4 mg total) by mouth once daily. for 10 doses 10 capsule 0    tiZANidine (ZANAFLEX) 4 MG tablet Take 1 tablet (4 mg total) by mouth every 8 (eight) hours as needed. 30 tablet 1    [DISCONTINUED] levETIRAcetam (KEPPRA) 500 MG Tab Take 2 tablets (1,000 mg total) by mouth 2 (two) times daily. 120 tablet 11     No facility-administered encounter medications on file as of 10/12/2022.       History of Present Illness  27 y/o WM here today for f/u for primary generalized epilepsy.      Prior to recent medication changes his last seizure was in 2019.  He was treated with topamax 200mg Bid and had been on this treatment 18 months with no complications.  He however was diagnosed with x2 kidney stones in a 3 month span and thus topamax was not recommended any longer.  He  was started on keppra 500mg bid and once up to speed on dosing, was slow tapered off the topamax.  About a week after complete removal from topamax he had a seizure.  Was seen in the ER, had CT head, cervical, and lumbar with no apparent injury noted.  Keppra was increased to 1000mg Bid.  We repeated EEG,n oted negative, but prior EEG in 2015 showed generalized epilepsy.  He had another seizure on 9/16/22, we then added back topamax 50mg bid and increased keppra to 1500mg bid.  He was seen in the ER on 9/29/22, with reported lower back pain since his prior seizure on 9/12/22.  XR thoracic questioned a possible T12 fracture so I ordered for a CT of the T -spine, this has shown a compression fracture of T10,11, and 12.  Patient on presentation today reported mid back pain of 9/10, mainly when moving, denies radiculopathy.  I am going to get him referral to Dr. Álvarez for further evaluation.  Patient states he has obtained a back brace and is using during day, but mainly he is laying down which seems to relieve all symptoms of pain per his report.  One exam I note no neurological deficits, he is able to get up from sitting and is able to get onto exam table without assistance but obvious discomfort in doing so.    Originally he was treated with depakote but continued with seizures and was changed to topamax.         Review of Systems  Review of Systems   Constitutional:  Negative for diaphoresis and fever.   HENT:  Negative for congestion, hearing loss and tinnitus.    Eyes:  Negative for blurred vision, double vision, photophobia, discharge and redness.   Respiratory:  Negative for cough and shortness of breath.    Cardiovascular:  Negative for chest pain.   Gastrointestinal:  Negative for abdominal pain, nausea and vomiting.   Musculoskeletal:  Positive for back pain. Negative for joint pain, myalgias and neck pain.   Skin:  Negative for itching and rash.   Neurological:  Positive for seizures. Negative for dizziness,  tremors, sensory change, speech change, focal weakness, loss of consciousness, weakness and headaches.   Psychiatric/Behavioral:  Negative for depression, hallucinations and memory loss. The patient does not have insomnia.    All other systems reviewed and are negative.   Objective:     NEUROLOGICAL EXAMINATION:     MENTAL STATUS   Oriented to person, place, and time.   Registration: recalls 3 of 3 objects. Recall at 5 minutes: recalls 3 of 3 objects.   Attention: normal. Concentration: normal.   Speech: speech is normal   Level of consciousness: alert  Knowledge: good and consistent with education.   Normal comprehension.     CRANIAL NERVES     CN II   Visual fields full to confrontation.   Visual acuity: normal  Right visual field deficit: none  Left visual field deficit: none     CN III, IV, VI   Pupils are equal, round, and reactive to light.  Extraocular motions are normal.   Right pupil: Size: 3 mm. Shape: regular. Reactivity: brisk. Consensual response: intact. Accommodation: intact.   Left pupil: Size: 3 mm. Shape: regular. Reactivity: brisk. Consensual response: intact. Accommodation: intact.   CN III: no CN III palsy  CN VI: no CN VI palsy  Nystagmus: none   Diplopia: none  Upgaze: normal  Downgaze: normal  Conjugate gaze: present  Vestibulo-ocular reflex: present    CN V   Facial sensation intact.   Right facial sensation deficit: none  Left facial sensation deficit: none  Right corneal reflex: normal  Left corneal reflex: normal    CN VII   Facial expression full, symmetric.   Right facial weakness: none  Left facial weakness: none  Right taste: normal  Left taste: normal    CN VIII   CN VIII normal.   Hearing: intact    CN IX, X   CN IX normal.   CN X normal.   Palate: symmetric    CN XI   CN XI normal.   Right sternocleidomastoid strength: normal  Left sternocleidomastoid strength: normal  Right trapezius strength: normal  Left trapezius strength: normal    CN XII   CN XII normal.   Tongue: not  atrophic  Fasciculations: absent  Tongue deviation: none    MOTOR EXAM   Muscle bulk: normal  Overall muscle tone: normal  Right arm tone: normal  Left arm tone: normal  Right arm pronator drift: absent  Left arm pronator drift: absent  Right leg tone: normal  Left leg tone: normal    Strength   Right neck flexion: 5/5  Left neck flexion: 5/5  Right neck extension: 5  Left neck extension:   Right deltoid:   Left deltoid:   Right biceps: 5  Left biceps:   Right triceps:   Left triceps: /  Right wrist flexion:   Left wrist flexion:   Right wrist extension:   Left wrist extension:   Right interossei:   Left interossei:   Right iliopsoas:   Left iliopsoas:   Right quadriceps:   Left quadriceps:   Right hamstrin/5  Left hamstrin/5  Right anterior tibial:   Left anterior tibial:   Right posterior tibial:   Left posterior tibial:   Right gastroc:   Left gastroc: /    REFLEXES     Reflexes   Right brachioradialis: 2+  Left brachioradialis: 2+  Right biceps: 2+  Left biceps: 2+  Right triceps: 2+  Left triceps: 2+  Right patellar: 2+  Left patellar: 2+  Right achilles: 2+  Left achilles: 2+  Right plantar: normal  Left plantar: normal  Right Stark: absent  Left Stark: absent  Right ankle clonus: absent  Left ankle clonus: absent  Right pendular knee jerk: absent  Left pendular knee jerk: absent    SENSORY EXAM   Light touch normal.   Right arm light touch: normal  Left arm light touch: normal  Right leg light touch: normal  Left leg light touch: normal  Vibration normal.   Right arm vibration: normal  Left arm vibration: normal  Right leg vibration: normal  Left leg vibration: normal  Proprioception normal.   Right arm proprioception: normal  Left arm proprioception: normal  Right leg proprioception: normal  Left leg proprioception: normal  Pinprick normal.   Right arm pinprick: normal  Left arm pinprick: normal  Right leg pinprick: normal  Left  leg pinprick: normal  Graphesthesia: normal  Romberg: negative  Stereognosis: normal    GAIT AND COORDINATION     Gait  Gait: wide-based     Coordination   Finger to nose coordination: normal  Heel to shin coordination: normal  Tandem walking coordination: abnormal    Tremor   Resting tremor: absent  Intention tremor: absent  Action tremor: absent       Using cane to assist with ambulation      Physical Exam  Vitals and nursing note reviewed.   Constitutional:       Appearance: Normal appearance.   HENT:      Head: Normocephalic.   Eyes:      Extraocular Movements: EOM normal.      Pupils: Pupils are equal, round, and reactive to light.   Cardiovascular:      Rate and Rhythm: Normal rate and regular rhythm.      Pulses: Normal pulses.      Heart sounds: Normal heart sounds.   Pulmonary:      Effort: Pulmonary effort is normal.      Breath sounds: Normal breath sounds.   Musculoskeletal:      Cervical back: Normal, normal range of motion and neck supple.      Thoracic back: Tenderness and bony tenderness present. No swelling, edema, deformity, signs of trauma, lacerations or spasms. Decreased range of motion. No scoliosis.      Lumbar back: Normal.   Skin:     General: Skin is warm and dry.   Neurological:      General: No focal deficit present.      Mental Status: He is alert and oriented to person, place, and time.      Cranial Nerves: No cranial nerve deficit.      Sensory: No sensory deficit.      Motor: No weakness.      Coordination: Coordination normal. Finger-Nose-Finger Test, Heel to Shin Test and Romberg Test normal.      Gait: Tandem walk abnormal. Gait normal.      Deep Tendon Reflexes: Reflexes normal.      Reflex Scores:       Tricep reflexes are 2+ on the right side and 2+ on the left side.       Bicep reflexes are 2+ on the right side and 2+ on the left side.       Brachioradialis reflexes are 2+ on the right side and 2+ on the left side.       Patellar reflexes are 2+ on the right side and 2+ on the  left side.       Achilles reflexes are 2+ on the right side and 2+ on the left side.  Psychiatric:         Mood and Affect: Mood normal.         Speech: Speech normal.         Behavior: Behavior normal.        Assessment:     Problem List Items Addressed This Visit          Neuro    Generalized epilepsy - Primary    Closed fracture of twelfth thoracic vertebra    Relevant Medications    meloxicam (MOBIC) 15 MG tablet    Other Relevant Orders    Ambulatory referral/consult to Orthopedics       Orthopedic    Muscle spasm    Relevant Medications    tiZANidine (ZANAFLEX) 4 MG tablet        Primary Diagnosis and ICD10  Generalized epilepsy [G40.309]    Plan:     Patient Instructions   Continue keppra 1500mg twice daily  No driving for 6 months per state law  Continue current topamax 50mg twice daily  Refer to Dr. Cristino Álvarez for thoracic spinal fractures  Zanaflex 4mg every 8 hours as needed for muscle spasms  Mobic 15mg daily as needed for lower back pain      Seizure Precautions:  1. Take medications as directed  2. Avoid open flames and hot surfaces such as fires and grills  3. Avoid elevations such as ladders or stools  4. Avoid swimming alone  5. Make sure getting plenty of sleep, recommend 7-8 hours per day  6. Avoid alcohol and illicit drugs  7. No driving or operating heavy machinery for 6 months after last known seizure     There are no discontinued medications.    Requested Prescriptions     Signed Prescriptions Disp Refills    meloxicam (MOBIC) 15 MG tablet 30 tablet 3     Sig: Take 1 tablet (15 mg total) by mouth once daily.    tiZANidine (ZANAFLEX) 4 MG tablet 30 tablet 1     Sig: Take 1 tablet (4 mg total) by mouth every 8 (eight) hours as needed.       Orders Placed This Encounter   Procedures    Ambulatory referral/consult to Orthopedics

## 2022-10-14 ENCOUNTER — OFFICE VISIT (OUTPATIENT)
Dept: SPINE | Facility: CLINIC | Age: 27
End: 2022-10-14
Payer: COMMERCIAL

## 2022-10-14 ENCOUNTER — HOSPITAL ENCOUNTER (OUTPATIENT)
Dept: RADIOLOGY | Facility: HOSPITAL | Age: 27
Discharge: HOME | End: 2022-10-14
Attending: ORTHOPAEDIC SURGERY
Payer: COMMERCIAL

## 2022-10-14 DIAGNOSIS — M54.9 MID BACK PAIN: Primary | ICD-10-CM

## 2022-10-14 DIAGNOSIS — S22.088A OTHER CLOSED FRACTURE OF TWELFTH THORACIC VERTEBRA, INITIAL ENCOUNTER: ICD-10-CM

## 2022-10-14 DIAGNOSIS — M54.9 MID BACK PAIN: ICD-10-CM

## 2022-10-14 PROCEDURE — 99212 OFFICE O/P EST SF 10 MIN: CPT | Mod: PBBFAC | Performed by: ORTHOPAEDIC SURGERY

## 2022-10-14 PROCEDURE — 99204 OFFICE O/P NEW MOD 45 MIN: CPT | Mod: S$PBB,,, | Performed by: ORTHOPAEDIC SURGERY

## 2022-10-14 PROCEDURE — 99204 PR OFFICE/OUTPT VISIT, NEW, LEVL IV, 45-59 MIN: ICD-10-PCS | Mod: S$PBB,,, | Performed by: ORTHOPAEDIC SURGERY

## 2022-10-14 PROCEDURE — 72082 XR SCOLIOSIS COMPLETE: ICD-10-PCS | Mod: 26,,, | Performed by: ORTHOPAEDIC SURGERY

## 2022-10-14 PROCEDURE — 72082 X-RAY EXAM ENTIRE SPI 2/3 VW: CPT | Mod: 26,,, | Performed by: ORTHOPAEDIC SURGERY

## 2022-10-14 PROCEDURE — 72082 X-RAY EXAM ENTIRE SPI 2/3 VW: CPT | Mod: TC

## 2022-10-14 NOTE — PROGRESS NOTES
MDM/time:  Greater than 45 minutes spent on this encounter including 15 minutes reviewing imaging and notes, 20 minutes with the patient, 10 minutes documentation    ASSESSMENT:  26 y.o. male with compression fracture T10, 11 and 12    PLAN:  TLSO brace  Follow-up 1 month x-rays prior to appointment    HPI:  26 y.o. male here for evaluation of mid to lower back pain that has been ongoing since 09/12/2022 after suffering a ground level fall while having a seizure.  Patient reports pain with standing sitting walking only time he can get pain relief is when he is lying flat.  He has been using a cane for balance no other falls other than the 1 on 09/12/2022.  No difficulty with  strength.  No bladder bowel incontinence.  Decreased walking tolerance.  Taking Zanaflex and Mobic as needed for pain.  No recent physical therapy.  No prior pain management.  No recent MRI.  No prior spine surgery.  Patient is not a smoker but dips tobacco daily    IMAGING:  10/14/2022 scoliosis series reviewed showed  On the AP there is normal coronal alignment of the thoracic and lumbar spine.  There are 5 non-rib-bearing lumbar vertebra.  On the lateral view there is maintained thoracic kyphosis and lumbar lordosis.  T10, T11, T12 fractures with maintained alignment.        Past Medical History:   Diagnosis Date    Attention deficit hyperactivity disorder     Bilateral low back pain 8/24/2022    Epilepsy, unspecified, intractable, without status epilepticus     Headache     Juvenile myoclonic epilepsy     Right renal stone 8/16/2022    KUB - unable to identify stone presence.  Will need CT if he becomes symptomatic.  Did not strain urine at work, but does not thing stone passed. Last significant renal colic 8/11, 0-9/10 on pain scale     No past surgical history on file.  Social History     Tobacco Use    Smoking status: Former     Types: Cigarettes    Smokeless tobacco: Current     Types: Snuff   Substance Use Topics    Alcohol  use: Not Currently    Drug use: Never      Current Outpatient Medications   Medication Instructions    citalopram (CELEXA) 20 mg, Oral, Daily    ergocalciferol (ERGOCALCIFEROL) 50,000 Units, Oral, Every 30 days    HYDROcodone-acetaminophen (NORCO) 5-325 mg per tablet 1 tablet, Oral, Every 4 hours PRN    levETIRAcetam (KEPPRA) 1,500 mg, Oral, 2 times daily    meloxicam (MOBIC) 15 mg, Oral, Daily    montelukast (SINGULAIR) 10 mg, Oral, Nightly    tamsulosin (FLOMAX) 0.4 mg, Oral, Daily    tiZANidine (ZANAFLEX) 4 mg, Oral, Every 8 hours PRN    topiramate (TOPAMAX) 50 mg, Oral, 2 times daily        EXAM:  Constitutional  General Appearance:  There is no height or weight on file to calculate BMI., NAD  Psychiatric   Orientation: Oriented to time, oriented to place, oriented to person  Mood and Affect: Active and alert, normal mood, normal affect  Gait and Station   Appearance:  Antalgic gait walks with a cane, unable to tandem gait, unable to walk on toes, unable to walk on heels    LUMBAR  Musculoskeletal System   Hips: Normal appearance, no leg length discrepancy, normal motion; left, normal motion; right    Lumbar Spine                   Inspection:  Normal alignment, normal sagittal balance                  Range of motion:  Decreased flexion, extension, lateral bending, rotation. Pain with range of motion                  Bony Palpation of the Lumbar Spine:  No tenderness of the spinous process, no tenderness of the sacrum, no tenderness of the coccyx                  Bony Palpation of the Right Hip:  No tenderness of the iliac crest, no tenderness of the sciatic notch, no tenderness of the SI joint                  Bony Palpation of the Left Hip:  No tenderness of the iliac crest, no tenderness of the sciatic notch, no tenderness of the SI joint                  Soft Tissue Palpation on the Right:  No tenderness of the paraspinal region, no tenderness of the iliolumbar region                  Soft Tissue Palpation  on the Left:  No tenderness of the paraspinal region, no tenderness of the iliolumbar region    Motor Strength   L1 Right:  Hip flexion iliopsoas 4/5    L1 Left:  Hip flexion iliopsoas 4/5              L2-L4 Right:  Knee extension quadriceps 4/5, tibialis anterior 4/5              L2-L4 Left:  Knee extension quadriceps 4/5, tibialis anterior 4/5   L5 Right:  Extensor hallucis llongus 5/5,    L5 Left:  Extensor hallucis longus 5/5,    S1 Right:  Plantar flexion gastrocnemius 5/5   S1 Left:  Plantar flexion gastrocnemius 5/5    Neurological System   Ankle Reflex Right:  normal   Ankle Reflex Left: normal   Knee Reflex Right:  normal   Knee Reflex Left:  normal   Sensation on the Right:  L2 normal, L3 normal, L4 normal, L5 normal, S1 normal   Sensation on the Left:  L2 normal, L3 normal, L4 normal, L5 normal, S1 normal              Special Test on the Right:  Seated straight leg raising test negative, no clonus of the ankle              Special Test on the Left:  Seated straight leg raising test negative, no clonus of the ankle    Skin   Lumbosacral Spine:  Normal skin    Cardiovascular System   Arterial Pulses Right:  Posterior tibialis normal, dorsalis pedis normal   Arterial Pulses Left:  Posterior tibialis normal, dorsalis pedis normal   Edema Right: None   Edema Left:  None

## 2022-10-17 ENCOUNTER — PATIENT MESSAGE (OUTPATIENT)
Dept: NEUROLOGY | Facility: CLINIC | Age: 27
End: 2022-10-17

## 2022-10-17 DIAGNOSIS — G40.309 GENERALIZED EPILEPSY: ICD-10-CM

## 2022-10-17 DIAGNOSIS — G40.309 GENERALIZED EPILEPSY: Primary | ICD-10-CM

## 2022-10-17 RX ORDER — TOPIRAMATE 50 MG/1
50 TABLET, FILM COATED ORAL 2 TIMES DAILY
Qty: 60 TABLET | Refills: 5 | Status: SHIPPED | OUTPATIENT
Start: 2022-10-17 | End: 2022-10-17 | Stop reason: SDUPTHER

## 2022-10-17 RX ORDER — TOPIRAMATE 50 MG/1
50 TABLET, FILM COATED ORAL 2 TIMES DAILY
Qty: 60 TABLET | Refills: 5 | Status: SHIPPED | OUTPATIENT
Start: 2022-10-17 | End: 2023-02-02 | Stop reason: SDUPTHER

## 2022-11-09 ENCOUNTER — OFFICE VISIT (OUTPATIENT)
Dept: NEUROLOGY | Facility: CLINIC | Age: 27
End: 2022-11-09
Payer: COMMERCIAL

## 2022-11-09 VITALS
HEIGHT: 75 IN | SYSTOLIC BLOOD PRESSURE: 118 MMHG | OXYGEN SATURATION: 97 % | BODY MASS INDEX: 39.17 KG/M2 | HEART RATE: 87 BPM | WEIGHT: 315 LBS | RESPIRATION RATE: 18 BRPM | DIASTOLIC BLOOD PRESSURE: 72 MMHG

## 2022-11-09 DIAGNOSIS — G40.309 GENERALIZED EPILEPSY: Primary | ICD-10-CM

## 2022-11-09 DIAGNOSIS — F32.A DEPRESSION, UNSPECIFIED DEPRESSION TYPE: ICD-10-CM

## 2022-11-09 DIAGNOSIS — S22.080D COMPRESSION FRACTURE OF T12 VERTEBRA WITH ROUTINE HEALING, SUBSEQUENT ENCOUNTER: ICD-10-CM

## 2022-11-09 PROCEDURE — 99212 OFFICE O/P EST SF 10 MIN: CPT | Mod: S$PBB,,, | Performed by: NURSE PRACTITIONER

## 2022-11-09 PROCEDURE — 99212 PR OFFICE/OUTPT VISIT, EST, LEVL II, 10-19 MIN: ICD-10-PCS | Mod: S$PBB,,, | Performed by: NURSE PRACTITIONER

## 2022-11-09 PROCEDURE — 99214 OFFICE O/P EST MOD 30 MIN: CPT | Mod: PBBFAC | Performed by: NURSE PRACTITIONER

## 2022-11-09 NOTE — PATIENT INSTRUCTIONS
Continue keppra 1500mg twice daily  No driving for 6 months per state law  Continue current topamax 50mg twice daily    Seizure Precautions:  1. Take medications as directed  2. Avoid open flames and hot surfaces such as fires and grills  3. Avoid elevations such as ladders or stools  4. Avoid swimming alone  5. Make sure getting plenty of sleep, recommend 7-8 hours per day  6. Avoid alcohol and illicit drugs  7. No driving or operating heavy machinery for 6 months after last known seizure

## 2022-11-09 NOTE — PROGRESS NOTES
Subjective:       Patient ID: Rito Bowens is a 26 y.o. male     Chief Complaint:    Chief Complaint   Patient presents with    Follow-up    Seizures              Allergies:  Amoxicillin    Current Medications:    Outpatient Encounter Medications as of 2022   Medication Sig Dispense Refill    citalopram (CELEXA) 20 MG tablet Take 1 tablet (20 mg total) by mouth once daily. 90 tablet 3    ergocalciferol (ERGOCALCIFEROL) 50,000 unit Cap Take 1 capsule (50,000 Units total) by mouth every 30 days. 3 capsule 3    levETIRAcetam (KEPPRA) 500 MG Tab Take 3 tablets (1,500 mg total) by mouth 2 (two) times daily. 180 tablet 11    meloxicam (MOBIC) 15 MG tablet Take 1 tablet (15 mg total) by mouth once daily. 30 tablet 3    montelukast (SINGULAIR) 10 mg tablet Take 1 tablet (10 mg total) by mouth every evening. 90 tablet 1    topiramate (TOPAMAX) 50 MG tablet Take 1 tablet (50 mg total) by mouth 2 (two) times daily. 60 tablet 5    HYDROcodone-acetaminophen (NORCO) 5-325 mg per tablet Take 1 tablet by mouth every 4 (four) hours as needed for Pain. (Patient not taking: Reported on 2022) 10 tablet 0    tamsulosin (FLOMAX) 0.4 mg Cap Take 1 capsule (0.4 mg total) by mouth once daily. for 10 doses 10 capsule 0    [] tiZANidine (ZANAFLEX) 4 MG tablet Take 1 tablet (4 mg total) by mouth every 8 (eight) hours as needed. 30 tablet 1    [DISCONTINUED] topiramate (TOPAMAX) 50 MG tablet Take 1 tablet (50 mg total) by mouth 2 (two) times daily. 60 tablet 0     No facility-administered encounter medications on file as of 2022.       History of Present Illness  25 y/o WM here today for f/u for primary generalized epilepsy.      Prior to recent medication changes his last seizure was in 2019.  He was treated with topamax 200mg Bid and had been on this treatment 18 months with no complications.  He however was diagnosed with x2 kidney stones in a 3 month span and thus topamax was not recommended any longer.   He was started on keppra 500mg bid and once up to speed on dosing, was slow tapered off the topamax.  About a week after complete removal from topamax he had a seizure.  Was seen in the ER, had CT head, cervical, and lumbar with no apparent injury noted.  Keppra was increased to 1000mg Bid.  We repeated EEG, noted negative, but prior EEG in 2015 showed generalized epilepsy.  He had another seizure on 9/16/22, we then added back topamax 50mg bid and increased keppra to 1500mg bid.  He was seen in the ER on 9/29/22, with reported lower back pain since his prior seizure on 9/12/22.  XR thoracic questioned a possible T12 fracture so I ordered for a CT of the T -spine, this has shown a compression fracture of T10,11, and 12.  I referred him to Dr. Álvarez who placed him in TLSO brace.    They deny further seizures since last visit, and he feels he is tolerating current treatment without complication    Originally he was treated with depakote but continued with seizures and was changed to topamax.         Review of Systems  Review of Systems   Constitutional:  Negative for diaphoresis and fever.   HENT:  Negative for congestion, hearing loss and tinnitus.    Eyes:  Negative for blurred vision, double vision, photophobia, discharge and redness.   Respiratory:  Negative for cough and shortness of breath.    Cardiovascular:  Negative for chest pain.   Gastrointestinal:  Negative for abdominal pain, nausea and vomiting.   Musculoskeletal:  Positive for back pain. Negative for joint pain, myalgias and neck pain.   Skin:  Negative for itching and rash.   Neurological:  Positive for seizures. Negative for dizziness, tremors, sensory change, speech change, focal weakness, loss of consciousness, weakness and headaches.   Psychiatric/Behavioral:  Negative for depression, hallucinations and memory loss. The patient does not have insomnia.    All other systems reviewed and are negative.   Objective:     NEUROLOGICAL EXAMINATION:      MENTAL STATUS   Oriented to person, place, and time.   Registration: recalls 3 of 3 objects. Recall at 5 minutes: recalls 3 of 3 objects.   Attention: normal. Concentration: normal.   Speech: speech is normal   Level of consciousness: alert  Knowledge: good and consistent with education.   Normal comprehension.     CRANIAL NERVES     CN II   Visual fields full to confrontation.   Visual acuity: normal  Right visual field deficit: none  Left visual field deficit: none     CN III, IV, VI   Pupils are equal, round, and reactive to light.  Extraocular motions are normal.   Right pupil: Size: 3 mm. Shape: regular. Reactivity: brisk. Consensual response: intact. Accommodation: intact.   Left pupil: Size: 3 mm. Shape: regular. Reactivity: brisk. Consensual response: intact. Accommodation: intact.   CN III: no CN III palsy  CN VI: no CN VI palsy  Nystagmus: none   Diplopia: none  Upgaze: normal  Downgaze: normal  Conjugate gaze: present  Vestibulo-ocular reflex: present    CN V   Facial sensation intact.   Right facial sensation deficit: none  Left facial sensation deficit: none  Right corneal reflex: normal  Left corneal reflex: normal    CN VII   Facial expression full, symmetric.   Right facial weakness: none  Left facial weakness: none  Right taste: normal  Left taste: normal    CN VIII   CN VIII normal.   Hearing: intact    CN IX, X   CN IX normal.   CN X normal.   Palate: symmetric    CN XI   CN XI normal.   Right sternocleidomastoid strength: normal  Left sternocleidomastoid strength: normal  Right trapezius strength: normal  Left trapezius strength: normal    CN XII   CN XII normal.   Tongue: not atrophic  Fasciculations: absent  Tongue deviation: none    MOTOR EXAM   Muscle bulk: normal  Overall muscle tone: normal  Right arm tone: normal  Left arm tone: normal  Right arm pronator drift: absent  Left arm pronator drift: absent  Right leg tone: normal  Left leg tone: normal    Strength   Right neck flexion:  5/  Left neck flexion: 5  Right neck extension:   Left neck extension:   Right deltoid:   Left deltoid:   Right biceps:   Left biceps:   Right triceps:   Left triceps:   Right wrist flexion:   Left wrist flexion:   Right wrist extension:   Left wrist extension:   Right interossei:   Left interossei:   Right iliopsoas:   Left iliopsoas:   Right quadriceps:   Left quadriceps:   Right hamstrin/5  Left hamstrin/5  Right anterior tibial:   Left anterior tibial:   Right posterior tibial:   Left posterior tibial:   Right gastroc:   Left gastroc:     REFLEXES     Reflexes   Right brachioradialis: 2+  Left brachioradialis: 2+  Right biceps: 2+  Left biceps: 2+  Right triceps: 2+  Left triceps: 2+  Right patellar: 2+  Left patellar: 2+  Right achilles: 2+  Left achilles: 2+  Right plantar: normal  Left plantar: normal  Right Stark: absent  Left Stark: absent  Right ankle clonus: absent  Left ankle clonus: absent  Right pendular knee jerk: absent  Left pendular knee jerk: absent    SENSORY EXAM   Light touch normal.   Right arm light touch: normal  Left arm light touch: normal  Right leg light touch: normal  Left leg light touch: normal  Vibration normal.   Right arm vibration: normal  Left arm vibration: normal  Right leg vibration: normal  Left leg vibration: normal  Proprioception normal.   Right arm proprioception: normal  Left arm proprioception: normal  Right leg proprioception: normal  Left leg proprioception: normal  Pinprick normal.   Right arm pinprick: normal  Left arm pinprick: normal  Right leg pinprick: normal  Left leg pinprick: normal  Graphesthesia: normal  Romberg: negative  Stereognosis: normal    GAIT AND COORDINATION     Gait  Gait: wide-based     Coordination   Finger to nose coordination: normal  Heel to shin coordination: normal  Tandem walking coordination: abnormal    Tremor   Resting tremor: absent  Intention  tremor: absent  Action tremor: absent       Using cane to assist with ambulation      Physical Exam  Vitals and nursing note reviewed.   Constitutional:       Appearance: Normal appearance.   HENT:      Head: Normocephalic.   Eyes:      Extraocular Movements: EOM normal.      Pupils: Pupils are equal, round, and reactive to light.   Cardiovascular:      Rate and Rhythm: Normal rate and regular rhythm.      Pulses: Normal pulses.      Heart sounds: Normal heart sounds.   Pulmonary:      Effort: Pulmonary effort is normal.      Breath sounds: Normal breath sounds.   Musculoskeletal:      Cervical back: Normal, normal range of motion and neck supple.      Thoracic back: Tenderness and bony tenderness present. No swelling, edema, deformity, signs of trauma, lacerations or spasms. Decreased range of motion. No scoliosis.      Lumbar back: Normal.   Skin:     General: Skin is warm and dry.   Neurological:      General: No focal deficit present.      Mental Status: He is alert and oriented to person, place, and time.      Cranial Nerves: No cranial nerve deficit.      Sensory: No sensory deficit.      Motor: No weakness.      Coordination: Coordination normal. Finger-Nose-Finger Test, Heel to Shin Test and Romberg Test normal.      Gait: Tandem walk abnormal. Gait normal.      Deep Tendon Reflexes: Reflexes normal.      Reflex Scores:       Tricep reflexes are 2+ on the right side and 2+ on the left side.       Bicep reflexes are 2+ on the right side and 2+ on the left side.       Brachioradialis reflexes are 2+ on the right side and 2+ on the left side.       Patellar reflexes are 2+ on the right side and 2+ on the left side.       Achilles reflexes are 2+ on the right side and 2+ on the left side.  Psychiatric:         Mood and Affect: Mood normal.         Speech: Speech normal.         Behavior: Behavior normal.        Assessment:     Problem List Items Addressed This Visit          Neuro    Generalized epilepsy -  Primary    Compression fracture of T12 vertebra with routine healing       Psychiatric    Depression        Primary Diagnosis and ICD10  Generalized epilepsy [G40.309]    Plan:     Patient Instructions   Continue keppra 1500mg twice daily  No driving for 6 months per state law  Continue current topamax 50mg twice daily    Seizure Precautions:  1. Take medications as directed  2. Avoid open flames and hot surfaces such as fires and grills  3. Avoid elevations such as ladders or stools  4. Avoid swimming alone  5. Make sure getting plenty of sleep, recommend 7-8 hours per day  6. Avoid alcohol and illicit drugs  7. No driving or operating heavy machinery for 6 months after last known seizure     There are no discontinued medications.    Requested Prescriptions      No prescriptions requested or ordered in this encounter       No orders of the defined types were placed in this encounter.

## 2022-11-16 DIAGNOSIS — S22.080D COMPRESSION FRACTURE OF T12 VERTEBRA WITH ROUTINE HEALING: Primary | ICD-10-CM

## 2022-11-18 ENCOUNTER — OFFICE VISIT (OUTPATIENT)
Dept: SPINE | Facility: CLINIC | Age: 27
End: 2022-11-18
Payer: COMMERCIAL

## 2022-11-18 ENCOUNTER — HOSPITAL ENCOUNTER (OUTPATIENT)
Dept: RADIOLOGY | Facility: HOSPITAL | Age: 27
Discharge: HOME | End: 2022-11-18
Attending: ORTHOPAEDIC SURGERY
Payer: COMMERCIAL

## 2022-11-18 DIAGNOSIS — S22.080D COMPRESSION FRACTURE OF T12 VERTEBRA WITH ROUTINE HEALING: Primary | ICD-10-CM

## 2022-11-18 DIAGNOSIS — S22.080D COMPRESSION FRACTURE OF T12 VERTEBRA WITH ROUTINE HEALING: ICD-10-CM

## 2022-11-18 PROCEDURE — 99212 OFFICE O/P EST SF 10 MIN: CPT | Mod: PBBFAC | Performed by: ORTHOPAEDIC SURGERY

## 2022-11-18 PROCEDURE — 72070 X-RAY EXAM THORAC SPINE 2VWS: CPT | Mod: 26,,, | Performed by: ORTHOPAEDIC SURGERY

## 2022-11-18 PROCEDURE — 99214 PR OFFICE/OUTPT VISIT, EST, LEVL IV, 30-39 MIN: ICD-10-PCS | Mod: S$PBB,,, | Performed by: ORTHOPAEDIC SURGERY

## 2022-11-18 PROCEDURE — 99214 OFFICE O/P EST MOD 30 MIN: CPT | Mod: S$PBB,,, | Performed by: ORTHOPAEDIC SURGERY

## 2022-11-18 PROCEDURE — 72070 X-RAY EXAM THORAC SPINE 2VWS: CPT | Mod: TC

## 2022-11-18 PROCEDURE — 72070 XR THORACIC SPINE AP LATERAL: ICD-10-PCS | Mod: 26,,, | Performed by: ORTHOPAEDIC SURGERY

## 2022-11-21 NOTE — PROGRESS NOTES
MDM/time:  Greater than 30 minutes spent on this encounter including 10 minutes reviewing imaging and notes, 15 minutes with the patient, 5 minutes documentation    ASSESSMENT:  27 y.o. male with compression fracture T10, 11 and 12    PLAN:  Follow-up in 2 months with x-rays    HPI:  27 y.o. male here for repeat evaluation of mid to lower back pain that has been ongoing since 09/12/2022 after suffering a ground level fall while having a seizure.  Does recently got his brace.  Reports this helps some.  His pain has been improving.  Denies any new injuries.  No difficulty with  strength.  No bladder bowel incontinence.  Decreased walking tolerance.  Taking Zanaflex and Mobic as needed for pain.  No recent physical therapy.  No prior pain management.  No recent MRI.  No prior spine surgery.  Patient is not a smoker but dips tobacco daily    IMAGING:  10/14/2022 scoliosis series reviewed showed  On the AP there is normal coronal alignment of the thoracic and lumbar spine.  There are 5 non-rib-bearing lumbar vertebra.  On the lateral view there is maintained thoracic kyphosis and lumbar lordosis.  T10, T11, T12 fractures with maintained alignment.        Past Medical History:   Diagnosis Date    Attention deficit hyperactivity disorder     Bilateral low back pain 8/24/2022    Epilepsy, unspecified, intractable, without status epilepticus     Headache     Juvenile myoclonic epilepsy     Right renal stone 8/16/2022    KUB - unable to identify stone presence.  Will need CT if he becomes symptomatic.  Did not strain urine at work, but does not thing stone passed. Last significant renal colic 8/11, 0-9/10 on pain scale     No past surgical history on file.  Social History     Tobacco Use    Smoking status: Former     Types: Cigarettes    Smokeless tobacco: Current     Types: Snuff   Substance Use Topics    Alcohol use: Not Currently    Drug use: Never      Current Outpatient Medications   Medication Instructions     citalopram (CELEXA) 20 mg, Oral, Daily    ergocalciferol (ERGOCALCIFEROL) 50,000 Units, Oral, Every 30 days    HYDROcodone-acetaminophen (NORCO) 5-325 mg per tablet 1 tablet, Oral, Every 4 hours PRN    levETIRAcetam (KEPPRA) 1,500 mg, Oral, 2 times daily    meloxicam (MOBIC) 15 mg, Oral, Daily    montelukast (SINGULAIR) 10 mg, Oral, Nightly    tamsulosin (FLOMAX) 0.4 mg, Oral, Daily    topiramate (TOPAMAX) 50 mg, Oral, 2 times daily        EXAM:  Constitutional  General Appearance:  There is no height or weight on file to calculate BMI., NAD  Psychiatric   Orientation: Oriented to time, oriented to place, oriented to person  Mood and Affect: Active and alert, normal mood, normal affect  Gait and Station   Appearance:  Antalgic gait walks with a cane, unable to tandem gait, unable to walk on toes, unable to walk on heels    LUMBAR  Musculoskeletal System   Hips: Normal appearance, no leg length discrepancy, normal motion; left, normal motion; right    Lumbar Spine                   Inspection:  Normal alignment, normal sagittal balance                  Range of motion:  Decreased flexion, extension, lateral bending, rotation. Pain with range of motion                  Bony Palpation of the Lumbar Spine:  No tenderness of the spinous process, no tenderness of the sacrum, no tenderness of the coccyx                  Bony Palpation of the Right Hip:  No tenderness of the iliac crest, no tenderness of the sciatic notch, no tenderness of the SI joint                  Bony Palpation of the Left Hip:  No tenderness of the iliac crest, no tenderness of the sciatic notch, no tenderness of the SI joint                  Soft Tissue Palpation on the Right:  No tenderness of the paraspinal region, no tenderness of the iliolumbar region                  Soft Tissue Palpation on the Left:  No tenderness of the paraspinal region, no tenderness of the iliolumbar region    Motor Strength   L1 Right:  Hip flexion iliopsoas 4/5    L1  Left:  Hip flexion iliopsoas 4/5              L2-L4 Right:  Knee extension quadriceps 4/5, tibialis anterior 4/5              L2-L4 Left:  Knee extension quadriceps 4/5, tibialis anterior 4/5   L5 Right:  Extensor hallucis llongus 5/5,    L5 Left:  Extensor hallucis longus 5/5,    S1 Right:  Plantar flexion gastrocnemius 5/5   S1 Left:  Plantar flexion gastrocnemius 5/5    Neurological System   Ankle Reflex Right:  normal   Ankle Reflex Left: normal   Knee Reflex Right:  normal   Knee Reflex Left:  normal   Sensation on the Right:  L2 normal, L3 normal, L4 normal, L5 normal, S1 normal   Sensation on the Left:  L2 normal, L3 normal, L4 normal, L5 normal, S1 normal              Special Test on the Right:  Seated straight leg raising test negative, no clonus of the ankle              Special Test on the Left:  Seated straight leg raising test negative, no clonus of the ankle    Skin   Lumbosacral Spine:  Normal skin    Cardiovascular System   Arterial Pulses Right:  Posterior tibialis normal, dorsalis pedis normal   Arterial Pulses Left:  Posterior tibialis normal, dorsalis pedis normal   Edema Right: None   Edema Left:  None

## 2023-01-18 DIAGNOSIS — Z09 FOLLOW-UP EXAMINATION AFTER ORTHOPEDIC SURGERY: Primary | ICD-10-CM

## 2023-02-02 ENCOUNTER — OFFICE VISIT (OUTPATIENT)
Dept: NEUROLOGY | Facility: CLINIC | Age: 28
End: 2023-02-02

## 2023-02-02 VITALS
BODY MASS INDEX: 39.17 KG/M2 | SYSTOLIC BLOOD PRESSURE: 120 MMHG | HEIGHT: 75 IN | OXYGEN SATURATION: 98 % | WEIGHT: 315 LBS | DIASTOLIC BLOOD PRESSURE: 84 MMHG

## 2023-02-02 DIAGNOSIS — G40.309 GENERALIZED EPILEPSY: ICD-10-CM

## 2023-02-02 DIAGNOSIS — F32.A DEPRESSION, UNSPECIFIED DEPRESSION TYPE: ICD-10-CM

## 2023-02-02 PROCEDURE — 99213 PR OFFICE/OUTPT VISIT, EST, LEVL III, 20-29 MIN: ICD-10-PCS | Mod: S$PBB,,, | Performed by: NURSE PRACTITIONER

## 2023-02-02 PROCEDURE — 99213 OFFICE O/P EST LOW 20 MIN: CPT | Mod: PBBFAC | Performed by: NURSE PRACTITIONER

## 2023-02-02 PROCEDURE — 99213 OFFICE O/P EST LOW 20 MIN: CPT | Mod: S$PBB,,, | Performed by: NURSE PRACTITIONER

## 2023-02-02 RX ORDER — TOPIRAMATE 50 MG/1
50 TABLET, FILM COATED ORAL 2 TIMES DAILY
Qty: 60 TABLET | Refills: 11 | Status: SHIPPED | OUTPATIENT
Start: 2023-02-02 | End: 2023-08-03 | Stop reason: SDUPTHER

## 2023-02-02 RX ORDER — CITALOPRAM 20 MG/1
20 TABLET, FILM COATED ORAL DAILY
Qty: 90 TABLET | Refills: 3 | Status: SHIPPED | OUTPATIENT
Start: 2023-02-02

## 2023-02-02 NOTE — PROGRESS NOTES
Subjective:       Patient ID: Rito Bowens is a 27 y.o. male     Chief Complaint:    No chief complaint on file.             Allergies:  Amoxicillin    Current Medications:    Outpatient Encounter Medications as of 2/2/2023   Medication Sig Dispense Refill    citalopram (CELEXA) 20 MG tablet Take 1 tablet (20 mg total) by mouth once daily. 90 tablet 3    ergocalciferol (ERGOCALCIFEROL) 50,000 unit Cap Take 1 capsule (50,000 Units total) by mouth every 30 days. 3 capsule 3    HYDROcodone-acetaminophen (NORCO) 5-325 mg per tablet Take 1 tablet by mouth every 4 (four) hours as needed for Pain. (Patient not taking: Reported on 11/9/2022) 10 tablet 0    levETIRAcetam (KEPPRA) 500 MG Tab Take 3 tablets (1,500 mg total) by mouth 2 (two) times daily. 180 tablet 11    meloxicam (MOBIC) 15 MG tablet Take 1 tablet (15 mg total) by mouth once daily. 30 tablet 3    montelukast (SINGULAIR) 10 mg tablet Take 1 tablet (10 mg total) by mouth every evening. 90 tablet 1    tamsulosin (FLOMAX) 0.4 mg Cap Take 1 capsule (0.4 mg total) by mouth once daily. for 10 doses 10 capsule 0    topiramate (TOPAMAX) 50 MG tablet Take 1 tablet (50 mg total) by mouth 2 (two) times daily. 60 tablet 5     No facility-administered encounter medications on file as of 2/2/2023.       History of Present Illness  28 y/o WM here today for f/u for primary generalized epilepsy.      Prior to recent medication changes his last seizure was in 2019.  He was treated with topamax 200mg Bid and had been on this treatment 18 months with no complications.  He however was diagnosed with x2 kidney stones in a 3 month span and thus topamax was not recommended any longer.  He was started on keppra 500mg bid and once up to speed on dosing, was slow tapered off the topamax.  About a week after complete removal from topamax he had a seizure.  Was seen in the ER, had CT head, cervical, and lumbar with no apparent injury noted.  Keppra was increased to  1000mg Bid.  We repeated EEG, noted negative, but prior EEG in 2015 showed generalized epilepsy.  He had another seizure on 9/16/22, we then added back topamax 50mg bid and increased keppra to 1500mg bid.  He was seen in the ER on 9/29/22, with reported lower back pain since his prior seizure on 9/12/22.  XR thoracic questioned a possible T12 fracture so I ordered for a CT of the T -spine, this has shown a compression fracture of T10,11, and 12.  I referred him to Dr. Álvarez who placed him in TLSO brace.  He continues to follow with him with last clinic visit reviewed and improving.    They deny further seizures since last visit, and he feels he is tolerating current treatment without complication    Originally he was treated with depakote but continued with seizures and was changed to topamax.         Review of Systems  Review of Systems   Constitutional:  Negative for diaphoresis and fever.   HENT:  Negative for congestion, hearing loss and tinnitus.    Eyes:  Negative for blurred vision, double vision, photophobia, discharge and redness.   Respiratory:  Negative for cough and shortness of breath.    Cardiovascular:  Negative for chest pain.   Gastrointestinal:  Negative for abdominal pain, nausea and vomiting.   Musculoskeletal:  Positive for back pain. Negative for joint pain, myalgias and neck pain.   Skin:  Negative for itching and rash.   Neurological:  Positive for seizures. Negative for dizziness, tremors, sensory change, speech change, focal weakness, loss of consciousness, weakness and headaches.   Psychiatric/Behavioral:  Negative for depression, hallucinations and memory loss. The patient does not have insomnia.    All other systems reviewed and are negative.   Objective:     NEUROLOGICAL EXAMINATION:     MENTAL STATUS   Oriented to person, place, and time.   Registration: recalls 3 of 3 objects. Recall at 5 minutes: recalls 3 of 3 objects.   Attention: normal. Concentration: normal.   Speech: speech is  normal   Level of consciousness: alert  Knowledge: good and consistent with education.   Normal comprehension.     CRANIAL NERVES     CN II   Visual fields full to confrontation.   Visual acuity: normal  Right visual field deficit: none  Left visual field deficit: none     CN III, IV, VI   Pupils are equal, round, and reactive to light.  Extraocular motions are normal.   Right pupil: Size: 3 mm. Shape: regular. Reactivity: brisk. Consensual response: intact. Accommodation: intact.   Left pupil: Size: 3 mm. Shape: regular. Reactivity: brisk. Consensual response: intact. Accommodation: intact.   CN III: no CN III palsy  CN VI: no CN VI palsy  Nystagmus: none   Diplopia: none  Upgaze: normal  Downgaze: normal  Conjugate gaze: present  Vestibulo-ocular reflex: present    CN V   Facial sensation intact.   Right facial sensation deficit: none  Left facial sensation deficit: none  Right corneal reflex: normal  Left corneal reflex: normal    CN VII   Facial expression full, symmetric.   Right facial weakness: none  Left facial weakness: none  Right taste: normal  Left taste: normal    CN VIII   CN VIII normal.   Hearing: intact    CN IX, X   CN IX normal.   CN X normal.   Palate: symmetric    CN XI   CN XI normal.   Right sternocleidomastoid strength: normal  Left sternocleidomastoid strength: normal  Right trapezius strength: normal  Left trapezius strength: normal    CN XII   CN XII normal.   Tongue: not atrophic  Fasciculations: absent  Tongue deviation: none    MOTOR EXAM   Muscle bulk: normal  Overall muscle tone: normal  Right arm tone: normal  Left arm tone: normal  Right arm pronator drift: absent  Left arm pronator drift: absent  Right leg tone: normal  Left leg tone: normal    Strength   Right neck flexion: 5/5  Left neck flexion: 5/5  Right neck extension: 5/5  Left neck extension: 5/5  Right deltoid: 5/5  Left deltoid: 5/5  Right biceps: 5/5  Left biceps: 5/5  Right triceps: 5/5  Left triceps: 5/5  Right wrist  flexion: 5/5  Left wrist flexion: 5/5  Right wrist extension: 5/5  Left wrist extension: 5/5  Right interossei: 5/5  Left interossei: 5/5  Right iliopsoas: 5/5  Left iliopsoas: 5/5  Right quadriceps: 5/5  Left quadriceps: 5/5  Right hamstrin/5  Left hamstrin/5  Right anterior tibial: 5/5  Left anterior tibial: 5/5  Right posterior tibial: 5/5  Left posterior tibial: 5/5  Right gastroc: 5/5  Left gastroc: 5/5    REFLEXES     Reflexes   Right brachioradialis: 2+  Left brachioradialis: 2+  Right biceps: 2+  Left biceps: 2+  Right triceps: 2+  Left triceps: 2+  Right patellar: 2+  Left patellar: 2+  Right achilles: 2+  Left achilles: 2+  Right plantar: normal  Left plantar: normal  Right Stark: absent  Left Stark: absent  Right ankle clonus: absent  Left ankle clonus: absent  Right pendular knee jerk: absent  Left pendular knee jerk: absent    SENSORY EXAM   Light touch normal.   Right arm light touch: normal  Left arm light touch: normal  Right leg light touch: normal  Left leg light touch: normal  Vibration normal.   Right arm vibration: normal  Left arm vibration: normal  Right leg vibration: normal  Left leg vibration: normal  Proprioception normal.   Right arm proprioception: normal  Left arm proprioception: normal  Right leg proprioception: normal  Left leg proprioception: normal  Pinprick normal.   Right arm pinprick: normal  Left arm pinprick: normal  Right leg pinprick: normal  Left leg pinprick: normal  Graphesthesia: normal  Romberg: negative  Stereognosis: normal    GAIT AND COORDINATION     Gait  Gait: wide-based     Coordination   Finger to nose coordination: normal  Heel to shin coordination: normal  Tandem walking coordination: abnormal    Tremor   Resting tremor: absent  Intention tremor: absent  Action tremor: absent       Using cane to assist with ambulation      Physical Exam  Vitals and nursing note reviewed.   Constitutional:       Appearance: Normal appearance.   HENT:      Head:  Normocephalic.   Eyes:      Extraocular Movements: EOM normal.      Pupils: Pupils are equal, round, and reactive to light.   Cardiovascular:      Rate and Rhythm: Normal rate and regular rhythm.      Pulses: Normal pulses.      Heart sounds: Normal heart sounds.   Pulmonary:      Effort: Pulmonary effort is normal.      Breath sounds: Normal breath sounds.   Musculoskeletal:      Cervical back: Normal, normal range of motion and neck supple.      Thoracic back: Tenderness and bony tenderness present. No swelling, edema, deformity, signs of trauma, lacerations or spasms. Decreased range of motion. No scoliosis.      Lumbar back: Normal.   Skin:     General: Skin is warm and dry.   Neurological:      General: No focal deficit present.      Mental Status: He is alert and oriented to person, place, and time.      Cranial Nerves: No cranial nerve deficit.      Sensory: No sensory deficit.      Motor: No weakness.      Coordination: Coordination normal. Finger-Nose-Finger Test, Heel to Shin Test and Romberg Test normal.      Gait: Tandem walk abnormal. Gait normal.      Deep Tendon Reflexes: Reflexes normal.      Reflex Scores:       Tricep reflexes are 2+ on the right side and 2+ on the left side.       Bicep reflexes are 2+ on the right side and 2+ on the left side.       Brachioradialis reflexes are 2+ on the right side and 2+ on the left side.       Patellar reflexes are 2+ on the right side and 2+ on the left side.       Achilles reflexes are 2+ on the right side and 2+ on the left side.  Psychiatric:         Mood and Affect: Mood normal.         Speech: Speech normal.         Behavior: Behavior normal.        Assessment:     Problem List Items Addressed This Visit    None         Primary Diagnosis and ICD10  No primary diagnosis found.    Plan:     There are no Patient Instructions on file for this visit.    There are no discontinued medications.    Requested Prescriptions      No prescriptions requested or ordered  in this encounter       No orders of the defined types were placed in this encounter.

## 2023-02-04 ENCOUNTER — PATIENT MESSAGE (OUTPATIENT)
Dept: FAMILY MEDICINE | Facility: CLINIC | Age: 28
End: 2023-02-04

## 2023-02-10 ENCOUNTER — PATIENT MESSAGE (OUTPATIENT)
Dept: NEUROLOGY | Facility: CLINIC | Age: 28
End: 2023-02-10

## 2023-02-10 DIAGNOSIS — J30.1 SEASONAL ALLERGIC RHINITIS DUE TO POLLEN: Chronic | ICD-10-CM

## 2023-02-10 DIAGNOSIS — Z87.09 HISTORY OF ASTHMA: ICD-10-CM

## 2023-02-10 DIAGNOSIS — R05.3 CHRONIC COUGH: Chronic | ICD-10-CM

## 2023-02-10 RX ORDER — MONTELUKAST SODIUM 10 MG/1
10 TABLET ORAL NIGHTLY
Qty: 90 TABLET | Refills: 1 | Status: SHIPPED | OUTPATIENT
Start: 2023-02-10 | End: 2023-08-03 | Stop reason: SDUPTHER

## 2023-08-03 ENCOUNTER — OFFICE VISIT (OUTPATIENT)
Dept: NEUROLOGY | Facility: CLINIC | Age: 28
End: 2023-08-03

## 2023-08-03 VITALS
WEIGHT: 315 LBS | SYSTOLIC BLOOD PRESSURE: 108 MMHG | OXYGEN SATURATION: 98 % | HEART RATE: 83 BPM | BODY MASS INDEX: 39.17 KG/M2 | DIASTOLIC BLOOD PRESSURE: 76 MMHG | HEIGHT: 75 IN

## 2023-08-03 DIAGNOSIS — Z87.09 HISTORY OF ASTHMA: ICD-10-CM

## 2023-08-03 DIAGNOSIS — G40.309 GENERALIZED EPILEPSY: Primary | ICD-10-CM

## 2023-08-03 DIAGNOSIS — J30.1 SEASONAL ALLERGIC RHINITIS DUE TO POLLEN: Chronic | ICD-10-CM

## 2023-08-03 DIAGNOSIS — R05.3 CHRONIC COUGH: Chronic | ICD-10-CM

## 2023-08-03 PROCEDURE — 99214 OFFICE O/P EST MOD 30 MIN: CPT | Mod: S$PBB,,, | Performed by: NURSE PRACTITIONER

## 2023-08-03 PROCEDURE — 99214 OFFICE O/P EST MOD 30 MIN: CPT | Mod: PBBFAC | Performed by: NURSE PRACTITIONER

## 2023-08-03 PROCEDURE — 99214 PR OFFICE/OUTPT VISIT, EST, LEVL IV, 30-39 MIN: ICD-10-PCS | Mod: S$PBB,,, | Performed by: NURSE PRACTITIONER

## 2023-08-03 RX ORDER — TOPIRAMATE 50 MG/1
50 TABLET, FILM COATED ORAL 2 TIMES DAILY
Qty: 60 TABLET | Refills: 11 | Status: SHIPPED | OUTPATIENT
Start: 2023-08-03 | End: 2023-09-02

## 2023-08-03 RX ORDER — LEVETIRACETAM 500 MG/1
1500 TABLET ORAL 2 TIMES DAILY
Qty: 180 TABLET | Refills: 11 | Status: SHIPPED | OUTPATIENT
Start: 2023-08-03 | End: 2024-08-02

## 2023-08-03 RX ORDER — MONTELUKAST SODIUM 10 MG/1
10 TABLET ORAL NIGHTLY
Qty: 90 TABLET | Refills: 1 | Status: SHIPPED | OUTPATIENT
Start: 2023-08-03 | End: 2024-02-07 | Stop reason: SDUPTHER

## 2023-08-03 NOTE — PATIENT INSTRUCTIONS
Continue keppra 1500mg twice daily  No driving for 6 months per state law  Continue current topamax 50mg twice daily  Continue the singulair once daily    Seizure Precautions:  1. Take medications as directed  2. Avoid open flames and hot surfaces such as fires and grills  3. Avoid elevations such as ladders or stools  4. Avoid swimming alone  5. Make sure getting plenty of sleep, recommend 7-8 hours per day  6. Avoid alcohol and illicit drugs  7. No driving or operating heavy machinery for 6 months after last known seizure

## 2023-08-03 NOTE — PROGRESS NOTES
Subjective:       Patient ID: Rito Bowens is a 27 y.o. male     Chief Complaint:    Chief Complaint   Patient presents with    Follow-up              Allergies:  Amoxicillin    Current Medications:    Outpatient Encounter Medications as of 8/3/2023   Medication Sig Dispense Refill    citalopram (CELEXA) 20 MG tablet Take 1 tablet (20 mg total) by mouth once daily. 90 tablet 3    ergocalciferol (ERGOCALCIFEROL) 50,000 unit Cap Take 1 capsule (50,000 Units total) by mouth every 30 days. 3 capsule 3    meloxicam (MOBIC) 15 MG tablet Take 1 tablet (15 mg total) by mouth once daily. 30 tablet 3    [DISCONTINUED] levETIRAcetam (KEPPRA) 500 MG Tab Take 3 tablets (1,500 mg total) by mouth 2 (two) times daily. 180 tablet 11    [DISCONTINUED] montelukast (SINGULAIR) 10 mg tablet Take 1 tablet (10 mg total) by mouth every evening. 90 tablet 1    HYDROcodone-acetaminophen (NORCO) 5-325 mg per tablet Take 1 tablet by mouth every 4 (four) hours as needed for Pain. (Patient not taking: Reported on 11/9/2022) 10 tablet 0    levETIRAcetam (KEPPRA) 500 MG Tab Take 3 tablets (1,500 mg total) by mouth 2 (two) times daily. 180 tablet 11    montelukast (SINGULAIR) 10 mg tablet Take 1 tablet (10 mg total) by mouth every evening. 90 tablet 1    tamsulosin (FLOMAX) 0.4 mg Cap Take 1 capsule (0.4 mg total) by mouth once daily. for 10 doses 10 capsule 0    topiramate (TOPAMAX) 50 MG tablet Take 1 tablet (50 mg total) by mouth 2 (two) times daily. 60 tablet 11    [DISCONTINUED] topiramate (TOPAMAX) 50 MG tablet Take 1 tablet (50 mg total) by mouth 2 (two) times daily. 60 tablet 11     No facility-administered encounter medications on file as of 8/3/2023.       History of Present Illness  28 y/o WM here today for f/u for primary generalized epilepsy.      Prior to recent medication changes his last seizure was in 2019.  He was treated with topamax 200mg Bid and had been on this treatment 18 months with no complications.  He  however was diagnosed with x2 kidney stones in a 3 month span and thus topamax was not recommended any longer.  He was started on keppra 500mg bid and once up to speed on dosing, was slow tapered off the topamax.  About a week after complete removal from topamax he had a seizure.  Was seen in the ER, had CT head, cervical, and lumbar with no apparent injury noted.  Keppra was increased to 1000mg Bid.  We repeated EEG, noted negative, but prior EEG in 2015 showed generalized epilepsy.  He had another seizure on 9/16/22, we then added back topamax 50mg bid and increased keppra to 1500mg bid.  He was seen in the ER on 9/29/22, with reported lower back pain since his prior seizure on 9/12/22.  XR thoracic questioned a possible T12 fracture so I ordered for a CT of the T -spine, this has shown a compression fracture of T10,11, and 12.  I referred him to Dr. Álvarez who placed him in TLSO brace.  He continues to follow with him with last clinic visit reviewed and improving.    They deny further seizures since last visit, and he feels he is tolerating current treatment without complication    Originally he was treated with depakote but continued with seizures and was changed to topamax.           Review of Systems  Review of Systems   Constitutional:  Negative for diaphoresis and fever.   HENT:  Negative for congestion, hearing loss and tinnitus.    Eyes:  Negative for blurred vision, double vision, photophobia, discharge and redness.   Respiratory:  Negative for cough and shortness of breath.    Cardiovascular:  Negative for chest pain.   Gastrointestinal:  Negative for abdominal pain, nausea and vomiting.   Musculoskeletal:  Positive for back pain. Negative for joint pain, myalgias and neck pain.   Skin:  Negative for itching and rash.   Neurological:  Positive for seizures. Negative for dizziness, tremors, sensory change, speech change, focal weakness, loss of consciousness, weakness and headaches.    Psychiatric/Behavioral:  Negative for depression, hallucinations and memory loss. The patient does not have insomnia.    All other systems reviewed and are negative.     Objective:     NEUROLOGICAL EXAMINATION:     MENTAL STATUS   Oriented to person, place, and time.   Registration: recalls 3 of 3 objects. Recall at 5 minutes: recalls 3 of 3 objects.   Attention: normal. Concentration: normal.   Speech: speech is normal   Level of consciousness: alert  Knowledge: good and consistent with education.   Normal comprehension.     CRANIAL NERVES     CN II   Visual fields full to confrontation.   Visual acuity: normal  Right visual field deficit: none  Left visual field deficit: none     CN III, IV, VI   Pupils are equal, round, and reactive to light.  Extraocular motions are normal.   Right pupil: Size: 3 mm. Shape: regular. Reactivity: brisk. Consensual response: intact. Accommodation: intact.   Left pupil: Size: 3 mm. Shape: regular. Reactivity: brisk. Consensual response: intact. Accommodation: intact.   CN III: no CN III palsy  CN VI: no CN VI palsy  Nystagmus: none   Diplopia: none  Upgaze: normal  Downgaze: normal  Conjugate gaze: present  Vestibulo-ocular reflex: present    CN V   Facial sensation intact.   Right facial sensation deficit: none  Left facial sensation deficit: none  Right corneal reflex: normal  Left corneal reflex: normal    CN VII   Facial expression full, symmetric.   Right facial weakness: none  Left facial weakness: none  Right taste: normal  Left taste: normal    CN VIII   CN VIII normal.   Hearing: intact    CN IX, X   CN IX normal.   CN X normal.   Palate: symmetric    CN XI   CN XI normal.   Right sternocleidomastoid strength: normal  Left sternocleidomastoid strength: normal  Right trapezius strength: normal  Left trapezius strength: normal    CN XII   CN XII normal.   Tongue: not atrophic  Fasciculations: absent  Tongue deviation: none    MOTOR EXAM   Muscle bulk: normal  Overall muscle  tone: normal  Right arm tone: normal  Left arm tone: normal  Right arm pronator drift: absent  Left arm pronator drift: absent  Right leg tone: normal  Left leg tone: normal    Strength   Right neck flexion: 5/5  Left neck flexion: 5/5  Right neck extension: 5/5  Left neck extension: 5/5  Right deltoid: 5/5  Left deltoid: 5/5  Right biceps:   Left biceps:   Right triceps:   Left triceps: /  Right wrist flexion:   Left wrist flexion: /  Right wrist extension: 5/5  Left wrist extension:   Right interossei:   Left interossei:   Right iliopsoas:   Left iliopsoas:   Right quadriceps:   Left quadriceps:   Right hamstrin/5  Left hamstrin/5  Right anterior tibial:   Left anterior tibial:   Right posterior tibial:   Left posterior tibial:   Right gastroc:   Left gastroc:     REFLEXES     Reflexes   Right brachioradialis: 2+  Left brachioradialis: 2+  Right biceps: 2+  Left biceps: 2+  Right triceps: 2+  Left triceps: 2+  Right patellar: 2+  Left patellar: 2+  Right achilles: 2+  Left achilles: 2+  Right plantar: normal  Left plantar: normal  Right Stark: absent  Left Stark: absent  Right ankle clonus: absent  Left ankle clonus: absent  Right pendular knee jerk: absent  Left pendular knee jerk: absent    SENSORY EXAM   Light touch normal.   Right arm light touch: normal  Left arm light touch: normal  Right leg light touch: normal  Left leg light touch: normal  Vibration normal.   Right arm vibration: normal  Left arm vibration: normal  Right leg vibration: normal  Left leg vibration: normal  Proprioception normal.   Right arm proprioception: normal  Left arm proprioception: normal  Right leg proprioception: normal  Left leg proprioception: normal  Pinprick normal.   Right arm pinprick: normal  Left arm pinprick: normal  Right leg pinprick: normal  Left leg pinprick: normal  Graphesthesia: normal  Romberg: negative  Stereognosis: normal    GAIT AND COORDINATION      Gait  Gait: wide-based     Coordination   Finger to nose coordination: normal  Heel to shin coordination: normal  Tandem walking coordination: abnormal    Tremor   Resting tremor: absent  Intention tremor: absent  Action tremor: absent       Using cane to assist with ambulation        Physical Exam  Vitals and nursing note reviewed.   Constitutional:       Appearance: Normal appearance.   HENT:      Head: Normocephalic.   Eyes:      Extraocular Movements: EOM normal.      Pupils: Pupils are equal, round, and reactive to light.   Cardiovascular:      Rate and Rhythm: Normal rate and regular rhythm.      Pulses: Normal pulses.      Heart sounds: Normal heart sounds.   Pulmonary:      Effort: Pulmonary effort is normal.      Breath sounds: Normal breath sounds.   Musculoskeletal:      Cervical back: Normal, normal range of motion and neck supple.      Thoracic back: Tenderness and bony tenderness present. No swelling, edema, deformity, signs of trauma, lacerations or spasms. Decreased range of motion. No scoliosis.      Lumbar back: Normal.   Skin:     General: Skin is warm and dry.   Neurological:      General: No focal deficit present.      Mental Status: He is alert and oriented to person, place, and time.      Cranial Nerves: No cranial nerve deficit.      Sensory: No sensory deficit.      Motor: No weakness.      Coordination: Coordination normal. Finger-Nose-Finger Test, Heel to Shin Test and Romberg Test normal.      Gait: Tandem walk abnormal. Gait normal.      Deep Tendon Reflexes: Reflexes normal.      Reflex Scores:       Tricep reflexes are 2+ on the right side and 2+ on the left side.       Bicep reflexes are 2+ on the right side and 2+ on the left side.       Brachioradialis reflexes are 2+ on the right side and 2+ on the left side.       Patellar reflexes are 2+ on the right side and 2+ on the left side.       Achilles reflexes are 2+ on the right side and 2+ on the left side.  Psychiatric:          Mood and Affect: Mood normal.         Speech: Speech normal.         Behavior: Behavior normal.          Assessment:     Problem List Items Addressed This Visit          Neuro    Generalized epilepsy - Primary    Relevant Medications    levETIRAcetam (KEPPRA) 500 MG Tab    topiramate (TOPAMAX) 50 MG tablet       ENT    Seasonal allergic rhinitis due to pollen    Relevant Medications    montelukast (SINGULAIR) 10 mg tablet       Pulmonary    Chronic cough (Chronic)    Relevant Medications    montelukast (SINGULAIR) 10 mg tablet     Other Visit Diagnoses       History of asthma        Relevant Medications    montelukast (SINGULAIR) 10 mg tablet               Primary Diagnosis and ICD10  Generalized epilepsy [G40.309]    Plan:     Patient Instructions   Continue keppra 1500mg twice daily  No driving for 6 months per state law  Continue current topamax 50mg twice daily  Continue the singulair once daily    Seizure Precautions:  1. Take medications as directed  2. Avoid open flames and hot surfaces such as fires and grills  3. Avoid elevations such as ladders or stools  4. Avoid swimming alone  5. Make sure getting plenty of sleep, recommend 7-8 hours per day  6. Avoid alcohol and illicit drugs  7. No driving or operating heavy machinery for 6 months after last known seizure     Medications Discontinued During This Encounter   Medication Reason    levETIRAcetam (KEPPRA) 500 MG Tab Reorder    topiramate (TOPAMAX) 50 MG tablet Reorder    montelukast (SINGULAIR) 10 mg tablet Reorder       Requested Prescriptions     Signed Prescriptions Disp Refills    levETIRAcetam (KEPPRA) 500 MG Tab 180 tablet 11     Sig: Take 3 tablets (1,500 mg total) by mouth 2 (two) times daily.    topiramate (TOPAMAX) 50 MG tablet 60 tablet 11     Sig: Take 1 tablet (50 mg total) by mouth 2 (two) times daily.    montelukast (SINGULAIR) 10 mg tablet 90 tablet 1     Sig: Take 1 tablet (10 mg total) by mouth every evening.       No orders of the  defined types were placed in this encounter.

## 2024-01-26 NOTE — PROGRESS NOTES
"Pt states he takes gabapentin 2 caps four times a day, not 1 cap four times a day.  Also states the last Rx for trazodone indicated \"name brand/no substitutions\" and asking if this verbiage can be removed.      Does not need refills at this time, just wants chart corrected for next fill.  Please advise and send this message back to me so can notify pt it was taken care of.     " AP and lateral views scoliosis series reviewed    On the AP there is normal coronal alignment of the thoracic and lumbar spine.  There are 5 non-rib-bearing lumbar vertebra.  On the lateral view there is maintained thoracic kyphosis and lumbar lordosis.  T10, T11, T12 fractures with maintained alignment.      Impression:  T10-T12 fractures

## 2024-02-07 DIAGNOSIS — Z87.09 HISTORY OF ASTHMA: ICD-10-CM

## 2024-02-07 DIAGNOSIS — J30.1 SEASONAL ALLERGIC RHINITIS DUE TO POLLEN: Chronic | ICD-10-CM

## 2024-02-07 DIAGNOSIS — R05.3 CHRONIC COUGH: Chronic | ICD-10-CM

## 2024-02-07 RX ORDER — MONTELUKAST SODIUM 10 MG/1
10 TABLET ORAL NIGHTLY
Qty: 90 TABLET | Refills: 1 | Status: SHIPPED | OUTPATIENT
Start: 2024-02-07

## 2024-03-13 ENCOUNTER — OFFICE VISIT (OUTPATIENT)
Dept: NEUROLOGY | Facility: CLINIC | Age: 29
End: 2024-03-13

## 2024-03-13 VITALS
DIASTOLIC BLOOD PRESSURE: 80 MMHG | HEIGHT: 75 IN | OXYGEN SATURATION: 98 % | WEIGHT: 315 LBS | SYSTOLIC BLOOD PRESSURE: 118 MMHG | HEART RATE: 79 BPM | BODY MASS INDEX: 39.17 KG/M2

## 2024-03-13 DIAGNOSIS — G40.309 GENERALIZED EPILEPSY: Primary | ICD-10-CM

## 2024-03-13 PROCEDURE — 99214 OFFICE O/P EST MOD 30 MIN: CPT | Mod: PBBFAC | Performed by: NURSE PRACTITIONER

## 2024-03-13 PROCEDURE — 99212 OFFICE O/P EST SF 10 MIN: CPT | Mod: S$PBB,,, | Performed by: NURSE PRACTITIONER

## 2024-03-13 NOTE — PROGRESS NOTES
Subjective:       Patient ID: Rito Bowens is a 28 y.o. male     Chief Complaint:    Chief Complaint   Patient presents with    Follow-up              Allergies:  Amoxicillin    Current Medications:    Outpatient Encounter Medications as of 3/13/2024   Medication Sig Dispense Refill    citalopram (CELEXA) 20 MG tablet Take 1 tablet (20 mg total) by mouth once daily. 90 tablet 3    ergocalciferol (ERGOCALCIFEROL) 50,000 unit Cap Take 1 capsule (50,000 Units total) by mouth every 30 days. 3 capsule 3    levETIRAcetam (KEPPRA) 500 MG Tab Take 3 tablets (1,500 mg total) by mouth 2 (two) times daily. 180 tablet 11    meloxicam (MOBIC) 15 MG tablet Take 1 tablet (15 mg total) by mouth once daily. 30 tablet 3    montelukast (SINGULAIR) 10 mg tablet Take 1 tablet (10 mg total) by mouth every evening. 90 tablet 1    HYDROcodone-acetaminophen (NORCO) 5-325 mg per tablet Take 1 tablet by mouth every 4 (four) hours as needed for Pain. (Patient not taking: Reported on 11/9/2022) 10 tablet 0    tamsulosin (FLOMAX) 0.4 mg Cap Take 1 capsule (0.4 mg total) by mouth once daily. for 10 doses 10 capsule 0    topiramate (TOPAMAX) 50 MG tablet Take 1 tablet (50 mg total) by mouth 2 (two) times daily. 60 tablet 11     No facility-administered encounter medications on file as of 3/13/2024.       History of Present Illness  27 y/o WM here today for f/u for primary generalized epilepsy.      Prior to recent medication changes his last seizure was in 2019.  He was treated with topamax 200mg Bid and had been on this treatment 18 months with no complications.  He however was diagnosed with x2 kidney stones in a 3 month span and thus topamax was not recommended any longer.  He was started on keppra 500mg bid and once up to speed on dosing, was slow tapered off the topamax.  About a week after complete removal from topamax he had a seizure.  Was seen in the ER, had CT head, cervical, and lumbar with no apparent injury noted.  Keppra  was increased to 1000mg Bid.  We repeated EEG, noted negative, but prior EEG in 2015 showed generalized epilepsy.  He had another seizure on 9/16/22, we then added back topamax 50mg bid and increased keppra to 1500mg bid.  He was seen in the ER on 9/29/22, with reported lower back pain since his prior seizure on 9/12/22.  XR thoracic questioned a possible T12 fracture so I ordered for a CT of the T -spine, this has shown a compression fracture of T10,11, and 12.  I referred him to Dr. Álvarez who placed him in TLSO brace.  He continues to follow with him with last clinic visit reviewed and improving.    They deny further seizures since last visit, and he feels he is tolerating current treatment without complication    Originally he was treated with depakote but continued with seizures and was changed to topamax.           Review of Systems  Review of Systems   Constitutional:  Negative for diaphoresis and fever.   HENT:  Negative for congestion, hearing loss and tinnitus.    Eyes:  Negative for blurred vision, double vision, photophobia, discharge and redness.   Respiratory:  Negative for cough and shortness of breath.    Cardiovascular:  Negative for chest pain.   Gastrointestinal:  Negative for abdominal pain, nausea and vomiting.   Musculoskeletal:  Positive for back pain. Negative for joint pain, myalgias and neck pain.   Skin:  Negative for itching and rash.   Neurological:  Positive for seizures. Negative for dizziness, tremors, sensory change, speech change, focal weakness, loss of consciousness, weakness and headaches.   Psychiatric/Behavioral:  Negative for depression, hallucinations and memory loss. The patient does not have insomnia.    All other systems reviewed and are negative.     Objective:     NEUROLOGICAL EXAMINATION:     MENTAL STATUS   Oriented to person, place, and time.   Registration: recalls 3 of 3 objects. Recall at 5 minutes: recalls 3 of 3 objects.   Attention: normal. Concentration: normal.    Speech: speech is normal   Level of consciousness: alert  Knowledge: good and consistent with education.   Normal comprehension.     CRANIAL NERVES     CN II   Visual fields full to confrontation.   Visual acuity: normal  Right visual field deficit: none  Left visual field deficit: none     CN III, IV, VI   Pupils are equal, round, and reactive to light.  Extraocular motions are normal.   Right pupil: Size: 3 mm. Shape: regular. Reactivity: brisk. Consensual response: intact. Accommodation: intact.   Left pupil: Size: 3 mm. Shape: regular. Reactivity: brisk. Consensual response: intact. Accommodation: intact.   CN III: no CN III palsy  CN VI: no CN VI palsy  Nystagmus: none   Diplopia: none  Upgaze: normal  Downgaze: normal  Conjugate gaze: present  Vestibulo-ocular reflex: present    CN V   Facial sensation intact.   Right facial sensation deficit: none  Left facial sensation deficit: none  Right corneal reflex: normal  Left corneal reflex: normal    CN VII   Facial expression full, symmetric.   Right facial weakness: none  Left facial weakness: none  Right taste: normal  Left taste: normal    CN VIII   CN VIII normal.   Hearing: intact    CN IX, X   CN IX normal.   CN X normal.   Palate: symmetric    CN XI   CN XI normal.   Right sternocleidomastoid strength: normal  Left sternocleidomastoid strength: normal  Right trapezius strength: normal  Left trapezius strength: normal    CN XII   CN XII normal.   Tongue: not atrophic  Fasciculations: absent  Tongue deviation: none    MOTOR EXAM   Muscle bulk: normal  Overall muscle tone: normal  Right arm tone: normal  Left arm tone: normal  Right arm pronator drift: absent  Left arm pronator drift: absent  Right leg tone: normal  Left leg tone: normal    Strength   Right neck flexion: 5/5  Left neck flexion: 5/5  Right neck extension: 5/5  Left neck extension: 5/5  Right deltoid: 5/5  Left deltoid: 5/5  Right biceps: 5/5  Left biceps: 5/5  Right triceps: 5/5  Left  triceps: 5/5  Right wrist flexion: 5/5  Left wrist flexion: 5/5  Right wrist extension: 5/5  Left wrist extension: 5/5  Right interossei: 5/5  Left interossei: 5/5  Right iliopsoas: 5/5  Left iliopsoas: 5/5  Right quadriceps: 5/5  Left quadriceps: 5/5  Right hamstrin/5  Left hamstrin/5  Right anterior tibial: 5/5  Left anterior tibial: 5/5  Right posterior tibial: 5/5  Left posterior tibial: 5/5  Right gastroc: 5/5  Left gastroc: 5/5    REFLEXES     Reflexes   Right brachioradialis: 2+  Left brachioradialis: 2+  Right biceps: 2+  Left biceps: 2+  Right triceps: 2+  Left triceps: 2+  Right patellar: 2+  Left patellar: 2+  Right achilles: 2+  Left achilles: 2+  Right plantar: normal  Left plantar: normal  Right Stark: absent  Left Stark: absent  Right ankle clonus: absent  Left ankle clonus: absent  Right pendular knee jerk: absent  Left pendular knee jerk: absent    SENSORY EXAM   Light touch normal.   Right arm light touch: normal  Left arm light touch: normal  Right leg light touch: normal  Left leg light touch: normal  Vibration normal.   Right arm vibration: normal  Left arm vibration: normal  Right leg vibration: normal  Left leg vibration: normal  Proprioception normal.   Right arm proprioception: normal  Left arm proprioception: normal  Right leg proprioception: normal  Left leg proprioception: normal  Pinprick normal.   Right arm pinprick: normal  Left arm pinprick: normal  Right leg pinprick: normal  Left leg pinprick: normal  Graphesthesia: normal  Romberg: negative  Stereognosis: normal    GAIT AND COORDINATION     Gait  Gait: wide-based     Coordination   Finger to nose coordination: normal  Heel to shin coordination: normal  Tandem walking coordination: abnormal    Tremor   Resting tremor: absent  Intention tremor: absent  Action tremor: absent       Using cane to assist with ambulation        Physical Exam  Vitals and nursing note reviewed.   Constitutional:       Appearance: Normal  appearance.   HENT:      Head: Normocephalic.   Eyes:      Extraocular Movements: EOM normal.      Pupils: Pupils are equal, round, and reactive to light.   Cardiovascular:      Rate and Rhythm: Normal rate and regular rhythm.      Pulses: Normal pulses.      Heart sounds: Normal heart sounds.   Pulmonary:      Effort: Pulmonary effort is normal.      Breath sounds: Normal breath sounds.   Musculoskeletal:      Cervical back: Normal, normal range of motion and neck supple.      Thoracic back: Tenderness and bony tenderness present. No swelling, edema, deformity, signs of trauma, lacerations or spasms. Decreased range of motion. No scoliosis.      Lumbar back: Normal.   Skin:     General: Skin is warm and dry.   Neurological:      General: No focal deficit present.      Mental Status: He is alert and oriented to person, place, and time.      Cranial Nerves: No cranial nerve deficit.      Sensory: No sensory deficit.      Motor: No weakness.      Coordination: Coordination normal. Finger-Nose-Finger Test, Heel to Shin Test and Romberg Test normal.      Gait: Tandem walk abnormal. Gait normal.      Deep Tendon Reflexes: Reflexes normal.      Reflex Scores:       Tricep reflexes are 2+ on the right side and 2+ on the left side.       Bicep reflexes are 2+ on the right side and 2+ on the left side.       Brachioradialis reflexes are 2+ on the right side and 2+ on the left side.       Patellar reflexes are 2+ on the right side and 2+ on the left side.       Achilles reflexes are 2+ on the right side and 2+ on the left side.  Psychiatric:         Mood and Affect: Mood normal.         Speech: Speech normal.         Behavior: Behavior normal.          Assessment:     Problem List Items Addressed This Visit          Neuro    Generalized epilepsy - Primary            Primary Diagnosis and ICD10  Generalized epilepsy [G40.309]    Plan:     Patient Instructions   Continue keppra 1500mg twice daily  No driving for 6 months per  state law  Continue current topamax 50mg twice daily  Continue the singulair once daily    Seizure Precautions:  1. Take medications as directed  2. Avoid open flames and hot surfaces such as fires and grills  3. Avoid elevations such as ladders or stools  4. Avoid swimming alone  5. Make sure getting plenty of sleep, recommend 7-8 hours per day  6. Avoid alcohol and illicit drugs  7. No driving or operating heavy machinery for 6 months after last known seizure     There are no discontinued medications.      Requested Prescriptions      No prescriptions requested or ordered in this encounter       No orders of the defined types were placed in this encounter.

## 2024-05-02 DIAGNOSIS — F32.A DEPRESSION, UNSPECIFIED DEPRESSION TYPE: ICD-10-CM

## 2024-05-02 RX ORDER — CITALOPRAM 20 MG/1
20 TABLET, FILM COATED ORAL DAILY
Qty: 90 TABLET | Refills: 3 | Status: SHIPPED | OUTPATIENT
Start: 2024-05-02

## 2024-06-28 ENCOUNTER — HOSPITAL ENCOUNTER (EMERGENCY)
Facility: HOSPITAL | Age: 29
Discharge: HOME | End: 2024-06-28

## 2024-06-28 VITALS
BODY MASS INDEX: 39.17 KG/M2 | WEIGHT: 315 LBS | DIASTOLIC BLOOD PRESSURE: 71 MMHG | TEMPERATURE: 98 F | RESPIRATION RATE: 18 BRPM | OXYGEN SATURATION: 95 % | HEART RATE: 68 BPM | HEIGHT: 75 IN | SYSTOLIC BLOOD PRESSURE: 131 MMHG

## 2024-06-28 DIAGNOSIS — G43.801 OTHER MIGRAINE WITH STATUS MIGRAINOSUS, NOT INTRACTABLE: Primary | ICD-10-CM

## 2024-06-28 PROCEDURE — 99285 EMERGENCY DEPT VISIT HI MDM: CPT | Mod: 25

## 2024-06-28 PROCEDURE — 25000003 PHARM REV CODE 250

## 2024-06-28 PROCEDURE — 96365 THER/PROPH/DIAG IV INF INIT: CPT

## 2024-06-28 PROCEDURE — 63600175 PHARM REV CODE 636 W HCPCS

## 2024-06-28 PROCEDURE — 99284 EMERGENCY DEPT VISIT MOD MDM: CPT | Mod: ,,,

## 2024-06-28 RX ADMIN — SODIUM CHLORIDE 1000 ML: 9 INJECTION, SOLUTION INTRAVENOUS at 10:06

## 2024-06-28 RX ADMIN — PROMETHAZINE HYDROCHLORIDE 25 MG: 25 INJECTION INTRAMUSCULAR; INTRAVENOUS at 10:06

## 2024-06-28 NOTE — DISCHARGE INSTRUCTIONS
Continue home medication as prescribed.  Increase fluids to maintain proper hydration.  Obtain plenty of rest.  Follow up with the primary care provider in 1-2 days for a recheck.  Contact Neurology and obtain an appointment as soon as available for a recheck and to discuss further outpatient treatment strategies.  Return to the emergency department for uncontrolled pain, uncontrolled vomiting, dizziness, fever, loss of consciousness, or any other new or worrisome symptoms.

## 2024-06-28 NOTE — ED PROVIDER NOTES
Encounter Date: 6/28/2024       History     Chief Complaint   Patient presents with    Headache     Patient comes in with complaints of headache with sensitivity to light and sound, started last night. Patient reports hx migraine, reports sees neurologist for sejerrica     Patient is a 28-year-old male who presents to emergency department with complaints of a headache.  He describes the pain as a pounding sensation all over but most prominently in the posterior aspect of his head.  He reports that he has had these headaches for the past several weeks and has had no improvement with usual over-the-counter therapies.  He reports that he was not had any medication today for the headache.  He has known epilepsy and ADHD and reports compliance with the usual medications.  He reports that his seizures are well controlled at this time.  He denies any recent injury but does report a fall where he hit his head approximately 3 weeks prior.  Denies any fever, chills, cough or other URI symptoms, weakness, numbness, dizziness, syncope, or any other complaints at this time.  His blood pressure is 126/84, temperature 97.9°, heart rate 68, respirations 18, and oxygen saturation 96% on room air.  He appears in no immediate distress.    The history is provided by the patient.     Review of patient's allergies indicates:   Allergen Reactions    Amoxicillin Other (See Comments)    Tamiflu [oseltamivir] Other (See Comments)     Reports seizures     Past Medical History:   Diagnosis Date    Attention deficit hyperactivity disorder     Bilateral low back pain 8/24/2022    Epilepsy, unspecified, intractable, without status epilepticus     Headache     Juvenile myoclonic epilepsy     Right renal stone 8/16/2022    KUB - unable to identify stone presence.  Will need CT if he becomes symptomatic.  Did not strain urine at work, but does not thing stone passed. Last significant renal colic 8/11, 0-9/10 on pain scale     No past surgical history  on file.  Family History   Problem Relation Name Age of Onset    Heart disease Other      Hypertension Other      Diabetes Other      Cancer Other          type unknown     Social History     Tobacco Use    Smoking status: Former     Types: Cigarettes     Passive exposure: Current    Smokeless tobacco: Current     Types: Snuff   Substance Use Topics    Alcohol use: Not Currently    Drug use: Never     Review of Systems   Constitutional:  Negative for activity change, appetite change, chills and fever.   HENT:  Negative for congestion, sore throat and trouble swallowing.    Eyes: Negative.    Respiratory:  Negative for cough and shortness of breath.    Cardiovascular:  Negative for chest pain and palpitations.   Gastrointestinal:  Negative for abdominal distention, abdominal pain, diarrhea, nausea and vomiting.   Endocrine: Negative.    Genitourinary:  Negative for difficulty urinating, dysuria and frequency.   Musculoskeletal:  Negative for arthralgias, back pain, neck pain and neck stiffness.   Skin:  Negative for color change, pallor and rash.   Allergic/Immunologic: Negative.    Neurological:  Positive for seizures (known seizure disorder but reports no recent seizure activity) and headaches. Negative for dizziness, tremors, syncope, facial asymmetry, speech difficulty, weakness, light-headedness and numbness.   Hematological:  Does not bruise/bleed easily.   Psychiatric/Behavioral:  Negative for confusion. The patient is not nervous/anxious.    All other systems reviewed and are negative.      Physical Exam     Initial Vitals [06/28/24 0948]   BP Pulse Resp Temp SpO2   126/84 68 18 97.9 °F (36.6 °C) 96 %      MAP       --         Physical Exam    Nursing note and vitals reviewed.  Constitutional: He appears well-developed and well-nourished. He is not diaphoretic. No distress.   HENT:   Head: Normocephalic and atraumatic.   Mouth/Throat: Oropharynx is clear and moist.   Eyes: Conjunctivae and EOM are normal.  Pupils are equal, round, and reactive to light.   Neck: Neck supple.   Normal range of motion.  Cardiovascular:  Normal rate, regular rhythm and normal heart sounds.           Pulmonary/Chest: Breath sounds normal. No respiratory distress. He has no wheezes. He has no rhonchi. He has no rales. He exhibits no tenderness.   Abdominal: Abdomen is soft. Bowel sounds are normal. He exhibits no distension. There is no abdominal tenderness. There is no rebound and no guarding.   Musculoskeletal:         General: Normal range of motion.      Cervical back: Normal range of motion and neck supple.     Neurological: He is alert and oriented to person, place, and time. He has normal strength. No cranial nerve deficit or sensory deficit. Coordination and gait normal. GCS score is 15. GCS eye subscore is 4. GCS verbal subscore is 5. GCS motor subscore is 6.   Skin: Skin is warm and dry. Capillary refill takes less than 2 seconds.   Psychiatric: He has a normal mood and affect. His behavior is normal. Judgment and thought content normal.         Medical Screening Exam   See Full Note    ED Course   Procedures  Labs Reviewed - No data to display       Imaging Results              CT Head Without Contrast (Final result)  Result time 06/28/24 10:35:20      Final result by Manuel Fuentes DO (06/28/24 10:35:20)                   Impression:      No convincing imaging evidence of acute intracranial abnormality.    The CT exam was performed using one or more of the following dose    reduction techniques- Automated exposure control, adjustment of the mA    and/or kV according to patient size, and/or use of iterative    reconstructed technique.    Point of Service: [facility]      Electronically signed by: Manuel Fuentes  Date:    06/28/2024  Time:    10:35               Narrative:    EXAMINATION:  CT HEAD WITHOUT CONTRAST    CLINICAL HISTORY:  Headache, chronic, new features or increased frequency;    COMPARISON:  CT brain  September 12, 2022    TECHNIQUE:  Multiple axial tomographic images of the brain were obtained without the use of intravenous contrast.    FINDINGS:  Midline structures are nondisplaced.  No convincing evidence of acute intracranial hemorrhage.  No convincing evidence of hydrocephalus.  Visualized paranasal sinuses and mastoid air cells are predominantly clear.                                       Medications   sodium chloride 0.9% bolus 1,000 mL 1,000 mL (0 mLs Intravenous Stopped 6/28/24 1107)   promethazine (PHENERGAN) 25 mg in 0.9% NaCl 50 mL IVPB (0 mg Intravenous Stopped 6/28/24 1048)     Medical Decision Making  Patient is a 28-year-old male who presents to emergency department with complaints of a headache.  He is alert and oriented x4.  GCS 15.  Vital signs stable.  Afebrile with a temperature of 97.9°.  Reports a prior history of headaches that has been worse over the past several weeks.  He does not recall any imaging since he was around 17 years of age.  Does report a traumatic mechanism 2-3 weeks prior during a fall.  Known seizure disorder but seizures are well controlled per the patient and he reports compliance with his medications.  His neurological exam today reveals no abnormalities.  We will initiate IV access, provide hydration with 1 L normal saline, and attempt Phenergan 25 mg IV for control of his headaches.  We will also perform a CT of the head without contrast to rule out any obvious abnormalities.    11:05 to re-evaluate at this time.  He reports that he was feeling better after the Phenergan and fluids.  CT scan shows no acute abnormalities.  He was encouraged to hydrate well and rest throughout the weekend.  He was also established with Neurology and was encouraged to contact them today and obtain follow up for further outpatient workup and management.  Strict ED return precautions were explained in detail.  All questions were answered.  They verbalized understanding and are in  agreement with this plan.    Amount and/or Complexity of Data Reviewed  Independent Historian:      Details: Patient is a 28-year-old male who presents to emergency department with complaints of a headache.  He describes the pain as a pounding sensation all over but most prominently in the posterior aspect of his head.  He reports that he has had these headaches for the past several weeks and has had no improvement with usual over-the-counter therapies.  He reports that he was not had any medication today for the headache.  He has known epilepsy and ADHD and reports compliance with the usual medications.  He reports that his seizures are well controlled at this time.  He denies any recent injury but does report a fall where he hit his head approximately 3 weeks prior.  Denies any fever, chills, cough or other URI symptoms, weakness, numbness, dizziness, syncope, or any other complaints at this time.  His blood pressure is 126/84, temperature 97.9°, heart rate 68, respirations 18, and oxygen saturation 96% on room air.  He appears in no immediate distress.  Radiology: ordered.     Details: CT of the head without contrast shows no convincing imaging evidence of acute intracranial abnormality.    Risk  Risk Details: Given the large differential diagnosis for Rito Bowens, the decision making in this case is of high complexity.    After evaluating all of the data points in this case, the presentation of Rito Bowens is NOT consistent with fracture, meningitis/encephalitis, SAH/sentinel bleed, Intracranial Hemorrhage (ICH) (subdura/epidural), acute obstructive hydrocephalus, space occupying lesions, CVA, CO Poisoning, Basilar artery dissection, preeclampsia, cerebral venous thrombosis, hypertensive emergency, suicidal ideation, temporal Arteritis, Idiopathic Intracranial Hypertension (pseudotumor cerebri).    Strict return and follow-up precautions have been given by me personally.    Data  Reviewed/Counseling: I have reviewed the patient's vital signs, nursing notes, and other relevant tests/information. I had a detailed discussion regarding the historical points, exam findings, and any diagnostic results supporting the discharge diagnosis. I also discussed the need for outpatient follow-up and the need to return to the ED if symptoms worsen or if there are any questions or concerns that arise at home.   Final diagnoses:  [G43.801] Other migraine with status migrainosus, not intractable (Primary)                                      Clinical Impression:   Final diagnoses:  [G43.801] Other migraine with status migrainosus, not intractable (Primary)        ED Disposition Condition    Discharge Stable          ED Prescriptions    None       Follow-up Information       Follow up With Specialties Details Why Contact Info    Jerad Gill MD Family Medicine Schedule an appointment as soon as possible for a visit in 2 days  [address] [place] [address]  [facility] [place] [address]  [phone]      Abbe Day FNP Neurology, Emergency Medicine Call today  [address] [facility]  [place] [address]  [phone]               Atul Isbell FNP  06/28/24 9443

## 2024-06-28 NOTE — Clinical Note
"Rito Kim" Gogo was seen and treated in our emergency department on 6/28/2024.  He may return to work on 06/30/2024.       If you have any questions or concerns, please don't hesitate to call.      Atul Isbell, FNP"

## 2024-08-05 DIAGNOSIS — R05.3 CHRONIC COUGH: Chronic | ICD-10-CM

## 2024-08-05 DIAGNOSIS — Z87.09 HISTORY OF ASTHMA: ICD-10-CM

## 2024-08-05 DIAGNOSIS — J30.1 SEASONAL ALLERGIC RHINITIS DUE TO POLLEN: Chronic | ICD-10-CM

## 2024-08-05 RX ORDER — MONTELUKAST SODIUM 10 MG/1
10 TABLET ORAL NIGHTLY
Qty: 90 TABLET | Refills: 1 | Status: SHIPPED | OUTPATIENT
Start: 2024-08-05

## 2024-08-09 ENCOUNTER — OFFICE VISIT (OUTPATIENT)
Dept: FAMILY MEDICINE | Facility: CLINIC | Age: 29
End: 2024-08-09

## 2024-08-09 ENCOUNTER — PATIENT MESSAGE (OUTPATIENT)
Dept: NEUROLOGY | Facility: CLINIC | Age: 29
End: 2024-08-09
Payer: OTHER MISCELLANEOUS

## 2024-08-09 VITALS
DIASTOLIC BLOOD PRESSURE: 83 MMHG | HEART RATE: 82 BPM | WEIGHT: 315 LBS | HEIGHT: 75 IN | SYSTOLIC BLOOD PRESSURE: 120 MMHG | BODY MASS INDEX: 39.17 KG/M2

## 2024-08-09 DIAGNOSIS — J30.1 SEASONAL ALLERGIC RHINITIS DUE TO POLLEN: Chronic | ICD-10-CM

## 2024-08-09 DIAGNOSIS — E56.9 VITAMIN DEFICIENCY: ICD-10-CM

## 2024-08-09 DIAGNOSIS — G40.309 GENERALIZED EPILEPSY: Chronic | ICD-10-CM

## 2024-08-09 DIAGNOSIS — R42 DIZZINESS: Primary | ICD-10-CM

## 2024-08-09 PROCEDURE — 99214 OFFICE O/P EST MOD 30 MIN: CPT | Mod: ,,, | Performed by: FAMILY MEDICINE

## 2024-08-09 PROCEDURE — 83036 HEMOGLOBIN GLYCOSYLATED A1C: CPT | Mod: ,,, | Performed by: CLINICAL MEDICAL LABORATORY

## 2024-08-09 PROCEDURE — 80048 BASIC METABOLIC PNL TOTAL CA: CPT | Mod: ,,, | Performed by: CLINICAL MEDICAL LABORATORY

## 2024-08-09 PROCEDURE — 85025 COMPLETE CBC W/AUTO DIFF WBC: CPT | Mod: ,,, | Performed by: CLINICAL MEDICAL LABORATORY

## 2024-08-09 PROCEDURE — 82607 VITAMIN B-12: CPT | Mod: ,,, | Performed by: CLINICAL MEDICAL LABORATORY

## 2024-08-09 PROCEDURE — 82306 VITAMIN D 25 HYDROXY: CPT | Mod: ,,, | Performed by: CLINICAL MEDICAL LABORATORY

## 2024-08-09 PROCEDURE — 84443 ASSAY THYROID STIM HORMONE: CPT | Mod: ,,, | Performed by: CLINICAL MEDICAL LABORATORY

## 2024-08-10 LAB
25(OH)D3 SERPL-MCNC: 18.6 NG/ML
ANION GAP SERPL CALCULATED.3IONS-SCNC: 13 MMOL/L (ref 7–16)
BASOPHILS # BLD AUTO: 0.05 K/UL (ref 0–0.2)
BASOPHILS NFR BLD AUTO: 0.5 % (ref 0–1)
BUN SERPL-MCNC: 12 MG/DL (ref 7–18)
BUN/CREAT SERPL: 10 (ref 6–20)
CALCIUM SERPL-MCNC: 10.1 MG/DL (ref 8.5–10.1)
CHLORIDE SERPL-SCNC: 108 MMOL/L (ref 98–107)
CO2 SERPL-SCNC: 21 MMOL/L (ref 21–32)
CREAT SERPL-MCNC: 1.21 MG/DL (ref 0.7–1.3)
DIFFERENTIAL METHOD BLD: ABNORMAL
EGFR (NO RACE VARIABLE) (RUSH/TITUS): 84 ML/MIN/1.73M2
EOSINOPHIL # BLD AUTO: 0.12 K/UL (ref 0–0.5)
EOSINOPHIL NFR BLD AUTO: 1.1 % (ref 1–4)
ERYTHROCYTE [DISTWIDTH] IN BLOOD BY AUTOMATED COUNT: 13.8 % (ref 11.5–14.5)
EST. AVERAGE GLUCOSE BLD GHB EST-MCNC: 105 MG/DL
GLUCOSE SERPL-MCNC: 94 MG/DL (ref 74–106)
HBA1C MFR BLD HPLC: 5.3 % (ref 4.5–6.6)
HCT VFR BLD AUTO: 49.7 % (ref 40–54)
HGB BLD-MCNC: 16 G/DL (ref 13.5–18)
IMM GRANULOCYTES # BLD AUTO: 0.06 K/UL (ref 0–0.04)
IMM GRANULOCYTES NFR BLD: 0.6 % (ref 0–0.4)
LYMPHOCYTES # BLD AUTO: 4.13 K/UL (ref 1–4.8)
LYMPHOCYTES NFR BLD AUTO: 38.9 % (ref 27–41)
MCH RBC QN AUTO: 26.8 PG (ref 27–31)
MCHC RBC AUTO-ENTMCNC: 32.2 G/DL (ref 32–36)
MCV RBC AUTO: 83.2 FL (ref 80–96)
MONOCYTES # BLD AUTO: 0.93 K/UL (ref 0–0.8)
MONOCYTES NFR BLD AUTO: 8.8 % (ref 2–6)
MPC BLD CALC-MCNC: 11.2 FL (ref 9.4–12.4)
NEUTROPHILS # BLD AUTO: 5.32 K/UL (ref 1.8–7.7)
NEUTROPHILS NFR BLD AUTO: 50.1 % (ref 53–65)
NRBC # BLD AUTO: 0 X10E3/UL
NRBC, AUTO (.00): 0 %
PLATELET # BLD AUTO: 311 K/UL (ref 150–400)
POTASSIUM SERPL-SCNC: 3.7 MMOL/L (ref 3.5–5.1)
RBC # BLD AUTO: 5.97 M/UL (ref 4.6–6.2)
SODIUM SERPL-SCNC: 138 MMOL/L (ref 136–145)
TSH SERPL DL<=0.005 MIU/L-ACNC: 0.74 UIU/ML (ref 0.36–3.74)
VIT B12 SERPL-MCNC: 433 PG/ML (ref 193–986)
WBC # BLD AUTO: 10.61 K/UL (ref 4.5–11)

## 2024-08-12 DIAGNOSIS — G40.309 GENERALIZED EPILEPSY: ICD-10-CM

## 2024-08-12 RX ORDER — TOPIRAMATE 50 MG/1
50 TABLET, FILM COATED ORAL 2 TIMES DAILY
Qty: 60 TABLET | Refills: 11 | Status: SHIPPED | OUTPATIENT
Start: 2024-08-12 | End: 2025-08-07

## 2024-08-15 ENCOUNTER — PATIENT MESSAGE (OUTPATIENT)
Dept: FAMILY MEDICINE | Facility: CLINIC | Age: 29
End: 2024-08-15
Payer: OTHER MISCELLANEOUS

## 2024-08-19 DIAGNOSIS — G40.309 GENERALIZED EPILEPSY: ICD-10-CM

## 2024-08-19 RX ORDER — LEVETIRACETAM 500 MG/1
1500 TABLET ORAL 2 TIMES DAILY
Qty: 180 TABLET | Refills: 11 | Status: SHIPPED | OUTPATIENT
Start: 2024-08-19 | End: 2025-08-19

## 2024-08-22 ENCOUNTER — HOSPITAL ENCOUNTER (EMERGENCY)
Facility: HOSPITAL | Age: 29
Discharge: HOME | End: 2024-08-22

## 2024-08-22 VITALS
RESPIRATION RATE: 16 BRPM | BODY MASS INDEX: 39.17 KG/M2 | HEIGHT: 75 IN | OXYGEN SATURATION: 95 % | TEMPERATURE: 98 F | WEIGHT: 315 LBS | DIASTOLIC BLOOD PRESSURE: 86 MMHG | SYSTOLIC BLOOD PRESSURE: 132 MMHG | HEART RATE: 78 BPM

## 2024-08-22 DIAGNOSIS — R42 DIZZINESS: Primary | ICD-10-CM

## 2024-08-22 LAB
ALBUMIN SERPL BCP-MCNC: 3.6 G/DL (ref 3.5–5)
ALBUMIN/GLOB SERPL: 0.8 {RATIO}
ALP SERPL-CCNC: 110 U/L (ref 45–115)
ALT SERPL W P-5'-P-CCNC: 63 U/L (ref 16–61)
ANION GAP SERPL CALCULATED.3IONS-SCNC: 13 MMOL/L (ref 7–16)
AST SERPL W P-5'-P-CCNC: 26 U/L (ref 15–37)
BASOPHILS # BLD AUTO: 0.08 K/UL (ref 0–0.2)
BASOPHILS NFR BLD AUTO: 0.7 % (ref 0–1)
BILIRUB SERPL-MCNC: 0.4 MG/DL (ref ?–1.2)
BUN SERPL-MCNC: 11 MG/DL (ref 7–18)
BUN/CREAT SERPL: 9 (ref 6–20)
CALCIUM SERPL-MCNC: 8.9 MG/DL (ref 8.5–10.1)
CHLORIDE SERPL-SCNC: 104 MMOL/L (ref 98–107)
CO2 SERPL-SCNC: 26 MMOL/L (ref 21–32)
CREAT SERPL-MCNC: 1.18 MG/DL (ref 0.7–1.3)
DIFFERENTIAL METHOD BLD: ABNORMAL
EGFR (NO RACE VARIABLE) (RUSH/TITUS): 87 ML/MIN/1.73M2
EOSINOPHIL # BLD AUTO: 0.11 K/UL (ref 0–0.5)
EOSINOPHIL NFR BLD AUTO: 1 % (ref 1–4)
ERYTHROCYTE [DISTWIDTH] IN BLOOD BY AUTOMATED COUNT: 13.5 % (ref 11.5–14.5)
GLOBULIN SER-MCNC: 4.3 G/DL (ref 2–4)
GLUCOSE SERPL-MCNC: 101 MG/DL (ref 74–106)
HCT VFR BLD AUTO: 47.4 % (ref 40–54)
HGB BLD-MCNC: 15.8 G/DL (ref 13.5–18)
IMM GRANULOCYTES # BLD AUTO: 0.05 K/UL (ref 0–0.04)
IMM GRANULOCYTES NFR BLD: 0.5 % (ref 0–0.4)
LYMPHOCYTES # BLD AUTO: 3.74 K/UL (ref 1–4.8)
LYMPHOCYTES NFR BLD AUTO: 33.9 % (ref 27–41)
MCH RBC QN AUTO: 27.1 PG (ref 27–31)
MCHC RBC AUTO-ENTMCNC: 33.3 G/DL (ref 32–36)
MCV RBC AUTO: 81.2 FL (ref 80–96)
MONOCYTES # BLD AUTO: 0.67 K/UL (ref 0–0.8)
MONOCYTES NFR BLD AUTO: 6.1 % (ref 2–6)
MPC BLD CALC-MCNC: 10.2 FL (ref 9.4–12.4)
NEUTROPHILS # BLD AUTO: 6.39 K/UL (ref 1.8–7.7)
NEUTROPHILS NFR BLD AUTO: 57.8 % (ref 53–65)
NRBC # BLD AUTO: 0 X10E3/UL
NRBC, AUTO (.00): 0 %
PLATELET # BLD AUTO: 273 K/UL (ref 150–400)
POTASSIUM SERPL-SCNC: 4 MMOL/L (ref 3.5–5.1)
PROT SERPL-MCNC: 7.9 G/DL (ref 6.4–8.2)
RBC # BLD AUTO: 5.84 M/UL (ref 4.6–6.2)
SODIUM SERPL-SCNC: 139 MMOL/L (ref 136–145)
WBC # BLD AUTO: 11.04 K/UL (ref 4.5–11)

## 2024-08-22 PROCEDURE — 99284 EMERGENCY DEPT VISIT MOD MDM: CPT | Mod: 25

## 2024-08-22 PROCEDURE — 85025 COMPLETE CBC W/AUTO DIFF WBC: CPT | Performed by: NURSE PRACTITIONER

## 2024-08-22 PROCEDURE — 99284 EMERGENCY DEPT VISIT MOD MDM: CPT | Mod: ,,, | Performed by: NURSE PRACTITIONER

## 2024-08-22 PROCEDURE — 96360 HYDRATION IV INFUSION INIT: CPT

## 2024-08-22 PROCEDURE — 25000003 PHARM REV CODE 250: Performed by: NURSE PRACTITIONER

## 2024-08-22 PROCEDURE — 80053 COMPREHEN METABOLIC PANEL: CPT | Performed by: NURSE PRACTITIONER

## 2024-08-22 RX ORDER — MECLIZINE HCL 12.5 MG 12.5 MG/1
50 TABLET ORAL
Status: COMPLETED | OUTPATIENT
Start: 2024-08-22 | End: 2024-08-22

## 2024-08-22 RX ORDER — MECLIZINE HYDROCHLORIDE 25 MG/1
25 TABLET ORAL 3 TIMES DAILY PRN
Qty: 21 TABLET | Refills: 0 | Status: SHIPPED | OUTPATIENT
Start: 2024-08-22

## 2024-08-22 RX ADMIN — SODIUM CHLORIDE 1000 ML: 900 INJECTION, SOLUTION INTRAVENOUS at 10:08

## 2024-08-22 RX ADMIN — MECLIZINE 50 MG: 12.5 TABLET ORAL at 10:08

## 2024-08-22 NOTE — DISCHARGE INSTRUCTIONS
Follow-up with neuro as scheduled.  Hydrate well with plenty of water.  Take med as prescribed.  Continue home meds.  Return to the ER for new or worsening of your symptoms.

## 2024-08-22 NOTE — ED TRIAGE NOTES
Reports extreme dizziness that comes and goes every 2-3 weeks for several months, has seen his PCP with no resolution, has an appointment with neurology 09/16/24

## 2024-08-22 NOTE — ED PROVIDER NOTES
Encounter Date: 8/22/2024       History     Chief Complaint   Patient presents with    Dizziness     28 year old  male presents to the ER for dizziness.  Pt has been dealing with dizziness intermittently for about 2 months.  He had CT head on 6/28/24 which was negative for acute findings.  He saw local PCP on 8/9 and had normal lab (including Keppra level). Reports mild frontal HA.  He has hx of seizure disorder, which he takes Keppra and Topamax.    The history is provided by the patient.   Dizziness  This is a chronic problem. The current episode started more than 1 week ago. The problem occurs daily. The problem has not changed since onset.Associated symptoms include headaches. Pertinent negatives include no chest pain, no abdominal pain and no shortness of breath. Exacerbated by: head movement. Nothing relieves the symptoms. He has tried nothing for the symptoms.     Review of patient's allergies indicates:   Allergen Reactions    Amoxicillin Other (See Comments)    Tamiflu [oseltamivir] Other (See Comments)     Reports seizures     Past Medical History:   Diagnosis Date    Attention deficit hyperactivity disorder     Bilateral low back pain 08/24/2022    Epilepsy, unspecified, intractable, without status epilepticus     Headache     Juvenile myoclonic epilepsy     Migraine headache     Right renal stone 08/16/2022    KUB - unable to identify stone presence.  Will need CT if he becomes symptomatic.  Did not strain urine at work, but does not thing stone passed. Last significant renal colic 8/11, 0-9/10 on pain scale     History reviewed. No pertinent surgical history.  Family History   Problem Relation Name Age of Onset    Heart disease Other      Hypertension Other      Diabetes Other      Cancer Other          type unknown     Social History     Tobacco Use    Smoking status: Former     Types: Cigarettes     Passive exposure: Current    Smokeless tobacco: Current     Types: Snuff   Substance Use  Topics    Alcohol use: Not Currently    Drug use: Never     Review of Systems   Respiratory:  Negative for shortness of breath.    Cardiovascular:  Negative for chest pain.   Gastrointestinal:  Negative for abdominal pain.   Neurological:  Positive for dizziness, light-headedness and headaches. Negative for tremors, seizures, syncope, facial asymmetry, speech difficulty, weakness and numbness.       Physical Exam     Initial Vitals [08/22/24 1014]   BP Pulse Resp Temp SpO2   132/86 78 16 98.1 °F (36.7 °C) 95 %      MAP       --         Physical Exam    Nursing note and vitals reviewed.  Constitutional: He appears well-developed and well-nourished. He is not diaphoretic. He is Obese . He is cooperative.  Non-toxic appearance. He appears ill. No distress.   HENT:   Head: Normocephalic and atraumatic.   Eyes: Conjunctivae are normal. Pupils are equal, round, and reactive to light. Right eye exhibits nystagmus. Left eye exhibits nystagmus.   Slight horizontal nystagmus noted.    Neck: Neck supple.   Cardiovascular:  Normal rate, regular rhythm, normal heart sounds and intact distal pulses.     Exam reveals no gallop and no friction rub.       No murmur heard.  Pulmonary/Chest: Breath sounds normal. No respiratory distress. He has no wheezes. He has no rhonchi. He has no rales. He exhibits no tenderness.   Musculoskeletal:      Cervical back: Neck supple.     Neurological: He is alert and oriented to person, place, and time. He has normal strength. He is not disoriented. No cranial nerve deficit or sensory deficit. Gait normal. GCS score is 15. GCS eye subscore is 4. GCS verbal subscore is 5. GCS motor subscore is 6.   Skin: Skin is warm and dry. Capillary refill takes less than 2 seconds.   Psychiatric: He has a normal mood and affect.         Medical Screening Exam   See Full Note    ED Course   Procedures  Labs Reviewed   COMPREHENSIVE METABOLIC PANEL - Abnormal       Result Value    Sodium 139      Potassium 4.0       Chloride 104      CO2 26      Anion Gap 13      Glucose 101      BUN 11      Creatinine 1.18      BUN/Creatinine Ratio 9      Calcium 8.9      Total Protein 7.9      Albumin 3.6      Globulin 4.3 (*)     A/G Ratio 0.8      Bilirubin, Total 0.4      Alk Phos 110      ALT 63 (*)     AST 26      eGFR 87     CBC WITH DIFFERENTIAL - Abnormal    WBC 11.04 (*)     RBC 5.84      Hemoglobin 15.8      Hematocrit 47.4      MCV 81.2      MCH 27.1      MCHC 33.3      RDW 13.5      Platelet Count 273      MPV 10.2      Neutrophils % 57.8      Lymphocytes % 33.9      Monocytes % 6.1 (*)     Eosinophils % 1.0      Basophils % 0.7      Immature Granulocytes % 0.5 (*)     nRBC, Auto 0.0      Neutrophils, Abs 6.39      Lymphocytes, Absolute 3.74      Monocytes, Absolute 0.67      Eosinophils, Absolute 0.11      Basophils, Absolute 0.08      Immature Granulocytes, Absolute 0.05 (*)     nRBC, Absolute 0.00      Diff Type Auto     CBC W/ AUTO DIFFERENTIAL    Narrative:     The following orders were created for panel order CBC auto differential.  Procedure                               Abnormality         Status                     ---------                               -----------         ------                     CBC with Differential[[id number]]       Abnormal            Final result                 Please view results for these tests on the individual orders.          Imaging Results    None          Medications   sodium chloride 0.9% bolus 1,000 mL 1,000 mL (1,000 mLs Intravenous New Bag 8/22/24 1041)   meclizine tablet 50 mg (50 mg Oral Given 8/22/24 1041)     Medical Decision Making  Problems Addressed:  Dizziness: chronic illness or injury with exacerbation, progression, or side effects of treatment    Amount and/or Complexity of Data Reviewed  External Data Reviewed: labs, radiology and notes.  Labs: ordered. Decision-making details documented in ED Course.               ED Course as of 08/22/24 1117   Thu Aug 22, 2024   1108  Lab is stable.  Pt has had multiple workups without clear etiology of his symptoms.  He has follow-up scheduled with neuro this month. Will let him finish his IVFs and then prescribed Meclizine at [place].  Will encourage him to call his PCP and neuro NP to inform them of his ER visit today.  [AG]   1116 Pt reports dizziness resolved.  [AG]      ED Course User Index  [AG] Betty Kimball FNP                           Clinical Impression:   Final diagnoses:  [R42] Dizziness (Primary)        ED Disposition Condition    Discharge Stable          ED Prescriptions       Medication Sig Dispense Start Date End Date Auth. Provider    meclizine (ANTIVERT) 25 mg tablet Take 1 tablet (25 mg total) by mouth 3 (three) times daily as needed for Dizziness. 21 tablet 8/22/2024 -- Betty Kimball FNP          Follow-up Information       Follow up With Specialties Details Why Contact Info    Jerad Gill MD Family Medicine Call today  [address] [facility]  [facility] [place] [address]  [phone]               Betty Kimball FNP  08/22/24 1109       Betty Kimball FNP  08/22/24 1110       Betty Kimball FNP  08/22/24 1118

## 2024-08-22 NOTE — Clinical Note
"Rito Kim" Gogo was seen and treated in our emergency department on 8/22/2024.  He may return to work on 08/23/2024.       If you have any questions or concerns, please don't hesitate to call.      Betty Kimball, ZOEP"

## 2024-09-11 ENCOUNTER — HOSPITAL ENCOUNTER (EMERGENCY)
Facility: HOSPITAL | Age: 29
Discharge: HOME | End: 2024-09-11
Payer: COMMERCIAL

## 2024-09-11 VITALS
TEMPERATURE: 98 F | WEIGHT: 315 LBS | HEIGHT: 75 IN | OXYGEN SATURATION: 97 % | BODY MASS INDEX: 39.17 KG/M2 | SYSTOLIC BLOOD PRESSURE: 116 MMHG | HEART RATE: 72 BPM | DIASTOLIC BLOOD PRESSURE: 66 MMHG | RESPIRATION RATE: 20 BRPM

## 2024-09-11 DIAGNOSIS — T14.8XXA MUSCLE STRAIN: ICD-10-CM

## 2024-09-11 DIAGNOSIS — S39.012A STRAIN OF LUMBAR REGION, INITIAL ENCOUNTER: Primary | ICD-10-CM

## 2024-09-11 DIAGNOSIS — R10.9 RIGHT FLANK PAIN: ICD-10-CM

## 2024-09-11 LAB
ALBUMIN SERPL BCP-MCNC: 3.7 G/DL (ref 3.5–5)
ALBUMIN/GLOB SERPL: 0.8 {RATIO}
ALP SERPL-CCNC: 105 U/L (ref 45–115)
ALT SERPL W P-5'-P-CCNC: 65 U/L (ref 16–61)
ANION GAP SERPL CALCULATED.3IONS-SCNC: 13 MMOL/L (ref 7–16)
AST SERPL W P-5'-P-CCNC: 24 U/L (ref 15–37)
BASOPHILS # BLD AUTO: 0.04 K/UL (ref 0–0.2)
BASOPHILS NFR BLD AUTO: 0.3 % (ref 0–1)
BILIRUB SERPL-MCNC: 0.3 MG/DL (ref ?–1.2)
BILIRUB UR QL STRIP: NEGATIVE
BUN SERPL-MCNC: 13 MG/DL (ref 7–18)
BUN/CREAT SERPL: 9 (ref 6–20)
CALCIUM SERPL-MCNC: 8.8 MG/DL (ref 8.5–10.1)
CHLORIDE SERPL-SCNC: 105 MMOL/L (ref 98–107)
CLARITY UR: NORMAL
CO2 SERPL-SCNC: 25 MMOL/L (ref 21–32)
COLOR UR: YELLOW
CREAT SERPL-MCNC: 1.38 MG/DL (ref 0.7–1.3)
DIFFERENTIAL METHOD BLD: ABNORMAL
EGFR (NO RACE VARIABLE) (RUSH/TITUS): 72 ML/MIN/1.73M2
EOSINOPHIL # BLD AUTO: 0.1 K/UL (ref 0–0.5)
EOSINOPHIL NFR BLD AUTO: 0.8 % (ref 1–4)
ERYTHROCYTE [DISTWIDTH] IN BLOOD BY AUTOMATED COUNT: 13.2 % (ref 11.5–14.5)
GLOBULIN SER-MCNC: 4.5 G/DL (ref 2–4)
GLUCOSE SERPL-MCNC: 111 MG/DL (ref 74–106)
GLUCOSE UR STRIP-MCNC: NEGATIVE MG/DL
HCT VFR BLD AUTO: 48 % (ref 40–54)
HGB BLD-MCNC: 16 G/DL (ref 13.5–18)
IMM GRANULOCYTES # BLD AUTO: 0.03 K/UL (ref 0–0.04)
IMM GRANULOCYTES NFR BLD: 0.2 % (ref 0–0.4)
KETONES UR STRIP-SCNC: NORMAL MG/DL
LEUKOCYTE ESTERASE UR QL STRIP: NEGATIVE
LYMPHOCYTES # BLD AUTO: 4.13 K/UL (ref 1–4.8)
LYMPHOCYTES NFR BLD AUTO: 32 % (ref 27–41)
MCH RBC QN AUTO: 26.8 PG (ref 27–31)
MCHC RBC AUTO-ENTMCNC: 33.3 G/DL (ref 32–36)
MCV RBC AUTO: 80.5 FL (ref 80–96)
MONOCYTES # BLD AUTO: 0.84 K/UL (ref 0–0.8)
MONOCYTES NFR BLD AUTO: 6.5 % (ref 2–6)
MPC BLD CALC-MCNC: 10.3 FL (ref 9.4–12.4)
NEUTROPHILS # BLD AUTO: 7.76 K/UL (ref 1.8–7.7)
NEUTROPHILS NFR BLD AUTO: 60.2 % (ref 53–65)
NITRITE UR QL STRIP: NEGATIVE
NRBC # BLD AUTO: 0 X10E3/UL
NRBC, AUTO (.00): 0 %
PH UR STRIP: 7 PH UNITS
PLATELET # BLD AUTO: 310 K/UL (ref 150–400)
POTASSIUM SERPL-SCNC: 3.8 MMOL/L (ref 3.5–5.1)
PROT SERPL-MCNC: 8.2 G/DL (ref 6.4–8.2)
PROT UR QL STRIP: NEGATIVE
RBC # BLD AUTO: 5.96 M/UL (ref 4.6–6.2)
RBC # UR STRIP: NEGATIVE /UL
SODIUM SERPL-SCNC: 139 MMOL/L (ref 136–145)
SP GR UR STRIP: 1.02
UROBILINOGEN UR STRIP-ACNC: 1 MG/DL
WBC # BLD AUTO: 12.9 K/UL (ref 4.5–11)

## 2024-09-11 PROCEDURE — 25000003 PHARM REV CODE 250

## 2024-09-11 PROCEDURE — 81003 URINALYSIS AUTO W/O SCOPE: CPT

## 2024-09-11 PROCEDURE — 99285 EMERGENCY DEPT VISIT HI MDM: CPT | Mod: 25

## 2024-09-11 PROCEDURE — 99284 EMERGENCY DEPT VISIT MOD MDM: CPT | Mod: ,,,

## 2024-09-11 PROCEDURE — 63600175 PHARM REV CODE 636 W HCPCS

## 2024-09-11 PROCEDURE — 96375 TX/PRO/DX INJ NEW DRUG ADDON: CPT

## 2024-09-11 PROCEDURE — 85025 COMPLETE CBC W/AUTO DIFF WBC: CPT

## 2024-09-11 PROCEDURE — 80053 COMPREHEN METABOLIC PANEL: CPT

## 2024-09-11 PROCEDURE — 96361 HYDRATE IV INFUSION ADD-ON: CPT

## 2024-09-11 PROCEDURE — 96365 THER/PROPH/DIAG IV INF INIT: CPT

## 2024-09-11 RX ORDER — KETOROLAC TROMETHAMINE 30 MG/ML
15 INJECTION, SOLUTION INTRAMUSCULAR; INTRAVENOUS
Status: COMPLETED | OUTPATIENT
Start: 2024-09-11 | End: 2024-09-11

## 2024-09-11 RX ADMIN — PROMETHAZINE HYDROCHLORIDE 12.5 MG: 25 INJECTION INTRAMUSCULAR; INTRAVENOUS at 04:09

## 2024-09-11 RX ADMIN — SODIUM CHLORIDE 1000 ML: 9 INJECTION, SOLUTION INTRAVENOUS at 04:09

## 2024-09-11 RX ADMIN — KETOROLAC TROMETHAMINE 15 MG: 30 INJECTION, SOLUTION INTRAMUSCULAR at 04:09

## 2024-09-11 NOTE — ED PROVIDER NOTES
Encounter Date: 9/11/2024       History     Chief Complaint   Patient presents with    Flank Pain     Right     Patient is a 28-year-old male who presents to emergency department complaints of right flank pain over the past 1-2 days.  He reports that he has had intermittent stabbing sensation from his right flank along with a accompanied nausea but denies any vomiting.  Also reports some mild dysuria on his most recent void.  He denies any fever, chills, weakness, gross hematuria, or any other complaints at this time.  He does have a prior history of kidney stones and reports that this feels similar to his episodes in the past.  No recent urology follow up in the past 2 years.  Has a known history of seizure disorder but reports that they are well controlled at this time.  Does currently follow up with Neurology.  He appears in no immediate distress.    The history is provided by the patient.     Review of patient's allergies indicates:   Allergen Reactions    Amoxicillin Other (See Comments)    Tamiflu [oseltamivir] Other (See Comments)     Reports seizures     Past Medical History:   Diagnosis Date    Attention deficit hyperactivity disorder     Bilateral low back pain 08/24/2022    Epilepsy, unspecified, intractable, without status epilepticus     Headache     Juvenile myoclonic epilepsy     Migraine headache     Right renal stone 08/16/2022    KUB - unable to identify stone presence.  Will need CT if he becomes symptomatic.  Did not strain urine at work, but does not thing stone passed. Last significant renal colic 8/11, 0-9/10 on pain scale     History reviewed. No pertinent surgical history.  Family History   Problem Relation Name Age of Onset    Heart disease Other      Hypertension Other      Diabetes Other      Cancer Other          type unknown     Social History     Tobacco Use    Smoking status: Former     Types: Cigarettes     Passive exposure: Current    Smokeless tobacco: Current     Types: Snuff    Substance Use Topics    Alcohol use: Not Currently    Drug use: Never     Review of Systems   Constitutional:  Negative for activity change, appetite change, chills and fever.   HENT:  Negative for congestion and sore throat.    Eyes: Negative.    Respiratory:  Negative for cough and shortness of breath.    Cardiovascular:  Negative for chest pain and palpitations.   Gastrointestinal:  Positive for abdominal pain (right flank radiating around to the abdomen.) and nausea. Negative for blood in stool, diarrhea and vomiting.   Endocrine: Negative.    Genitourinary:  Positive for dysuria and flank pain (right).   Musculoskeletal:  Negative for arthralgias, back pain, neck pain and neck stiffness.   Skin:  Negative for color change, pallor and rash.   Allergic/Immunologic: Negative.    Neurological:  Negative for dizziness, syncope, speech difficulty, weakness, light-headedness and headaches.   Hematological:  Does not bruise/bleed easily.   Psychiatric/Behavioral:  Negative for confusion. The patient is not nervous/anxious.    All other systems reviewed and are negative.      Physical Exam     Initial Vitals [09/11/24 1610]   BP Pulse Resp Temp SpO2   118/79 88 20 98.1 °F (36.7 °C) 98 %      MAP       --         Physical Exam    Nursing note and vitals reviewed.  Constitutional: He appears well-developed and well-nourished. He is not diaphoretic. He is active and cooperative.  Non-toxic appearance. No distress.   HENT:   Head: Normocephalic and atraumatic.   Mouth/Throat: Oropharynx is clear and moist.   Eyes: Conjunctivae and EOM are normal. Pupils are equal, round, and reactive to light.   Neck: Neck supple.   Normal range of motion.  Cardiovascular:  Normal rate, regular rhythm and normal heart sounds.           Pulmonary/Chest: Breath sounds normal. No respiratory distress. He has no wheezes. He has no rhonchi. He has no rales. He exhibits no tenderness.   Abdominal: Abdomen is soft. Bowel sounds are normal. He  exhibits no distension. There is no abdominal tenderness (nontender to palpation).   There is right CVA tenderness.There is no rebound and no guarding.   Musculoskeletal:         General: Normal range of motion.      Cervical back: Normal range of motion and neck supple.     Neurological: He is alert and oriented to person, place, and time. He has normal strength. GCS score is 15. GCS eye subscore is 4. GCS verbal subscore is 5. GCS motor subscore is 6.   Skin: Skin is warm and dry. Capillary refill takes less than 2 seconds.   Psychiatric: He has a normal mood and affect. His behavior is normal. Judgment and thought content normal.         Medical Screening Exam   See Full Note    ED Course   Procedures  Labs Reviewed   COMPREHENSIVE METABOLIC PANEL - Abnormal       Result Value    Sodium 139      Potassium 3.8      Chloride 105      CO2 25      Anion Gap 13      Glucose 111 (*)     BUN 13      Creatinine 1.38 (*)     BUN/Creatinine Ratio 9      Calcium 8.8      Total Protein 8.2      Albumin 3.7      Globulin 4.5 (*)     A/G Ratio 0.8      Bilirubin, Total 0.3      Alk Phos 105      ALT 65 (*)     AST 24      eGFR 72     CBC WITH DIFFERENTIAL - Abnormal    WBC 12.90 (*)     RBC 5.96      Hemoglobin 16.0      Hematocrit 48.0      MCV 80.5      MCH 26.8 (*)     MCHC 33.3      RDW 13.2      Platelet Count 310      MPV 10.3      Neutrophils % 60.2      Lymphocytes % 32.0      Monocytes % 6.5 (*)     Eosinophils % 0.8 (*)     Basophils % 0.3      Immature Granulocytes % 0.2      nRBC, Auto 0.0      Neutrophils, Abs 7.76 (*)     Lymphocytes, Absolute 4.13      Monocytes, Absolute 0.84 (*)     Eosinophils, Absolute 0.10      Basophils, Absolute 0.04      Immature Granulocytes, Absolute 0.03      nRBC, Absolute 0.00      Diff Type Auto     URINALYSIS, REFLEX TO URINE CULTURE    Color, UA Yellow      Clarity, UA Cloudy      pH, UA 7.0      Leukocytes, UA Negative      Nitrites, UA Negative      Protein, UA Negative       Glucose, UA Negative      Ketones, UA Trace      Urobilinogen, UA 1.0      Bilirubin, UA Negative      Blood, UA Negative      Specific Gravity, UA 1.020     CBC W/ AUTO DIFFERENTIAL    Narrative:     The following orders were created for panel order CBC auto differential.  Procedure                               Abnormality         Status                     ---------                               -----------         ------                     CBC with Differential[[id number]]       Abnormal            Final result                 Please view results for these tests on the individual orders.          Imaging Results              CT Renal Stone Study ABD Pelvis WO (Final result)  Result time 09/11/24 17:31:01      Final result by Otto Ibrahim MD (09/11/24 17:31:01)                   Impression:      1. Right nonobstructive nephrolithiasis, no findings to suggest obstructive uropathy.  2. Findings suggest hepatic steatosis, correlation with LFTs recommended.  3. Please see above for additional findings.      Electronically signed by: Otto Ibrahim MD  Date:    09/11/2024  Time:    17:31               Narrative:    EXAMINATION:  CT RENAL STONE STUDY ABD PELVIS WO    CLINICAL HISTORY:  Flank pain, kidney stone suspected;    TECHNIQUE:  Low dose axial images, sagittal and coronal reformations were obtained from the lung bases to the pubic symphysis.  Contrast was not administered.    COMPARISON:  CT thoracic spine 10/07/2022    FINDINGS:  Images of the lower thorax are unremarkable noting motion artifact.    The liver is mildly hypoattenuating suggesting steatosis, correlation with LFTs recommended.  The spleen, pancreas, gallbladder and adrenal glands are grossly unremarkable.  Stomach is mildly distended with ingested content, no gastric wall thickening.  No significant abdominal lymphadenopathy.    There is right nonobstructive nephrolithiasis.  No left nephrolithiasis.  No hydronephrosis.  The bilateral  ureters are unremarkable without calculi seen.  The urinary bladder is decompressed without wall thickening.  The prostate is not enlarged.    The large bowel is grossly unremarkable.  The appendix and terminal ileum are unremarkable.  Small bowel is unremarkable.  There are a few scattered shotty periaortic, pericaval, and mesenteric lymph nodes.  No focal organized pelvic fluid collection.    No acute osseous abnormalities.  There are stable endplate deformities involving T12, T11 and T10, better evaluated CT 10/07/2022.                                       Medications   sodium chloride 0.9% bolus 1,000 mL 1,000 mL (0 mLs Intravenous Stopped 9/11/24 1801)   promethazine (PHENERGAN) 12.5 mg in 0.9% NaCl 50 mL IVPB (0 mg Intravenous Stopped 9/11/24 1703)   ketorolac injection 15 mg (15 mg Intravenous Given 9/11/24 1640)     Medical Decision Making  Presents for right flank pain, nausea, and dysuria.  He was alert and oriented x4.  GCS 15.  Vital signs stable.  Currently afebrile with a temperature 98.1°.  Nontoxic in appearance.  He does report some nausea but denies any vomiting.  Prior history of kidney stones reported.  Suspect UTI versus renal colic.  We will initiate IV access, obtain lab specimens, send urine sample, CT renal protocol, provide hydration with 1 L normal saline IV, provide Phenergan 12.5 mg IV for control of nausea, and Toradol 15 mg IV for pain control.    18:15 patient re-evaluated at this time.  He reports that he is feeling better after the fluids, Phenergan, and Toradol.  All results were discussed in detail with the patient.  No findings suggestive of obstructive uropathy, UTI, or other acute etiology.  We do suspect muscle strain.  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  We did recommend no lifting.  He was have a history of intermittent back pain and has a PRN Mobic at home and was instructed to resume  this therapy as directed.  Cautioned not to take this medication on an empty stomach.  Also recommended that he avoid other NSAIDs while taking the Mobic.  He may supplement with Tylenol.  Recommended a follow up in 1-2 days with the primary care provider.  Strict ED return precautions were explained in detail.  Patient and family verbalized understanding and agreement with this plan.    Amount and/or Complexity of Data Reviewed  Independent Historian:      Details: Patient is a 28-year-old male who presents to emergency department complaints of right flank pain over the past 1-2 days.  He reports that he has had intermittent stabbing sensation from his right flank along with a accompanied nausea but denies any vomiting.  Also reports some mild dysuria on his most recent void.  He denies any fever, chills, weakness, gross hematuria, or any other complaints at this time.  He does have a prior history of kidney stones and reports that this feels similar to his episodes in the past.  No recent urology follow up in the past 2 years.  Has a known history of seizure disorder but reports that they are well controlled at this time.  Does currently follow up with Neurology.  He appears in no immediate distress.  Labs: ordered.     Details: Urinalysis is unremarkable.  CBC shows a WBC of 12.9, hemoglobin of 16, hematocrit 48, platelet count 310.  CMP shows a glucose of 111, and creatinine of 1.38, globulin of 4.5, ALT of 65, and otherwise unremarkable.  Radiology: ordered.     Details: CT renal stone shows right nonobstructive nephrolithiasis, no findings to suggest obstructive uropathy, findings suggest hepatic steatosis.    Risk  Prescription drug management.  Risk Details: Patient presents for emergent evaluation of acute right flank pain that poses a threat to life and/or bodily function.    Final diagnoses:  [S39.012A] Strain of lumbar region, initial encounter (Primary)  [T14.8XXA] Muscle strain  [R10.9] Right flank  pain  I ordered labs and personally reviewed them.  Labs significant for WBC 12.9, stable H&H, creatinine of 1.38 with a normal BUN and GFR.  ALT of 65 with normal ALP and AST.  Negative urinalysis.  I ordered CT scan and personally reviewed it and reviewed the radiologist interpretation.  CT significant for right nonobstructive nephrolithiasis.  No findings of obstructive uropathy.   Results, home care, follow up, and strict ED return precautions were explained in detail to the patient and family.  They were informed that today's visit is only a snap shot in time and that there situation and results could certainly change which should prompt him to be re-evaluated.  All questions were answered.  They verbalized understanding and agreement with this plan.    Patient was managed in the ED with IV normal saline, Phenergan, and Toradol.    The response to treatment was improved.    Patient was discharged in stable condition.  Detailed return precautions discussed.                                       Clinical Impression:   Final diagnoses:  [S39.012A] Strain of lumbar region, initial encounter (Primary)  [T14.8XXA] Muscle strain  [R10.9] Right flank pain        ED Disposition Condition    Discharge Stable          ED Prescriptions    None       Follow-up Information       Follow up With Specialties Details Why Contact Info    Jerad Gill MD Family Medicine Schedule an appointment as soon as possible for a visit in 2 days  [address] [place] [address]  [facility] [place] [address]  [phone]               Atul Isbell, [facility]  09/11/24 9362

## 2024-09-11 NOTE — Clinical Note
"Rito Kim" Gogo was seen and treated in our emergency department on 9/11/2024.  He may return to work after being cleared by follow-up physician 09/14/2024.  No lifting until cleared by primary care provider.     If you have any questions or concerns, please don't hesitate to call.      Atul Isbell, ZOEP"

## 2024-09-11 NOTE — DISCHARGE INSTRUCTIONS
Continue home medications as directed.  Resume your Mobic as previously prescribed.  You may supplement with Tylenol but do not take any further over-the-counter anti-inflammatory medication while taking the Mobic.  Do not take the Mobic on empty stomach.  Obtain plenty of rest.  No heavy lifting until cleared by your primary care provider.  Arrange for a follow up appointment in 1-2 days with your regular doctor for a recheck.  Return to emergency department for weakness, numbness, uncontrolled vomiting, fever, loss of bowel or bladder control, or any other new or worrisome symptoms.

## 2024-09-16 ENCOUNTER — OFFICE VISIT (OUTPATIENT)
Dept: NEUROLOGY | Facility: CLINIC | Age: 29
End: 2024-09-16
Payer: COMMERCIAL

## 2024-09-16 VITALS
OXYGEN SATURATION: 97 % | DIASTOLIC BLOOD PRESSURE: 80 MMHG | BODY MASS INDEX: 39.17 KG/M2 | HEIGHT: 75 IN | WEIGHT: 315 LBS | HEART RATE: 91 BPM | SYSTOLIC BLOOD PRESSURE: 120 MMHG | RESPIRATION RATE: 18 BRPM

## 2024-09-16 DIAGNOSIS — G40.309 GENERALIZED EPILEPSY: Primary | ICD-10-CM

## 2024-09-16 PROCEDURE — 3074F SYST BP LT 130 MM HG: CPT | Mod: CPTII,,, | Performed by: NURSE PRACTITIONER

## 2024-09-16 PROCEDURE — 99999 PR PBB SHADOW E&M-EST. PATIENT-LVL IV: CPT | Mod: PBBFAC,,, | Performed by: NURSE PRACTITIONER

## 2024-09-16 PROCEDURE — 3079F DIAST BP 80-89 MM HG: CPT | Mod: CPTII,,, | Performed by: NURSE PRACTITIONER

## 2024-09-16 PROCEDURE — 1160F RVW MEDS BY RX/DR IN RCRD: CPT | Mod: CPTII,,, | Performed by: NURSE PRACTITIONER

## 2024-09-16 PROCEDURE — 1159F MED LIST DOCD IN RCRD: CPT | Mod: CPTII,,, | Performed by: NURSE PRACTITIONER

## 2024-09-16 PROCEDURE — 3044F HG A1C LEVEL LT 7.0%: CPT | Mod: CPTII,,, | Performed by: NURSE PRACTITIONER

## 2024-09-16 PROCEDURE — 3008F BODY MASS INDEX DOCD: CPT | Mod: CPTII,,, | Performed by: NURSE PRACTITIONER

## 2024-09-16 PROCEDURE — 99212 OFFICE O/P EST SF 10 MIN: CPT | Mod: S$PBB,,, | Performed by: NURSE PRACTITIONER

## 2024-09-16 PROCEDURE — 99214 OFFICE O/P EST MOD 30 MIN: CPT | Mod: PBBFAC | Performed by: NURSE PRACTITIONER

## 2024-09-16 NOTE — PROGRESS NOTES
Subjective:       Patient ID: Rito Bowens is a 28 y.o. male     Chief Complaint:    No chief complaint on file.             Allergies:  Amoxicillin and Tamiflu [oseltamivir]    Current Medications:    Outpatient Encounter Medications as of 9/16/2024   Medication Sig Dispense Refill    citalopram (CELEXA) 20 MG tablet Take 1 tablet (20 mg total) by mouth once daily. 90 tablet 3    levETIRAcetam (KEPPRA) 500 MG Tab Take 3 tablets (1,500 mg total) by mouth 2 (two) times daily. 180 tablet 11    meclizine (ANTIVERT) 25 mg tablet Take 1 tablet (25 mg total) by mouth 3 (three) times daily as needed for Dizziness. 21 tablet 0    meloxicam (MOBIC) 15 MG tablet Take 1 tablet (15 mg total) by mouth once daily. 30 tablet 3    montelukast (SINGULAIR) 10 mg tablet Take 1 tablet (10 mg total) by mouth every evening. 90 tablet 1    topiramate (TOPAMAX) 50 MG tablet Take 1 tablet (50 mg total) by mouth 2 (two) times daily. 60 tablet 11    [DISCONTINUED] ergocalciferol (ERGOCALCIFEROL) 50,000 unit Cap Take 1 capsule (50,000 Units total) by mouth every 30 days. 3 capsule 3    [DISCONTINUED] levETIRAcetam (KEPPRA) 500 MG Tab Take 3 tablets (1,500 mg total) by mouth 2 (two) times daily. 180 tablet 11     No facility-administered encounter medications on file as of 9/16/2024.       History of Present Illness  29 y/o WM here today for f/u for primary generalized epilepsy.      Prior to recent medication changes his last seizure was in 2019.  He was treated with topamax 200mg Bid and had been on this treatment 18 months with no complications.  He however was diagnosed with x2 kidney stones in a 3 month span and thus topamax was not recommended any longer.  He was started on keppra 500mg bid and once up to speed on dosing, was slow tapered off the topamax.  About a week after complete removal from topamax he had a seizure.  Was seen in the ER, had CT head, cervical, and lumbar with no apparent injury noted.  Keppra was  increased to 1000mg Bid.  We repeated EEG, noted negative, but prior EEG in 2015 showed generalized epilepsy.  He had another seizure on 9/16/22, we then added back topamax 50mg bid and increased keppra to 1500mg bid.  He actually had a T12 fracture after the seizure in September of 2022 and had to wear TLSO brace.    They deny further seizures since last visit, and he feels he is tolerating current treatment without complication    Originally he was treated with depakote but continued with seizures and was changed to topamax.           Review of Systems  Review of Systems   Constitutional:  Negative for diaphoresis and fever.   HENT:  Negative for congestion, hearing loss and tinnitus.    Eyes:  Negative for blurred vision, double vision, photophobia, discharge and redness.   Respiratory:  Negative for cough and shortness of breath.    Cardiovascular:  Negative for chest pain.   Gastrointestinal:  Negative for abdominal pain, nausea and vomiting.   Musculoskeletal:  Positive for back pain. Negative for joint pain, myalgias and neck pain.   Skin:  Negative for itching and rash.   Neurological:  Positive for seizures. Negative for dizziness, tremors, sensory change, speech change, focal weakness, loss of consciousness, weakness and headaches.   Psychiatric/Behavioral:  Negative for depression, hallucinations and memory loss. The patient does not have insomnia.    All other systems reviewed and are negative.     Objective:     NEUROLOGICAL EXAMINATION:     MENTAL STATUS   Oriented to person, place, and time.   Registration: recalls 3 of 3 objects. Recall at 5 minutes: recalls 3 of 3 objects.   Attention: normal. Concentration: normal.   Speech: speech is normal   Level of consciousness: alert  Knowledge: good and consistent with education.   Normal comprehension.     CRANIAL NERVES     CN II   Visual fields full to confrontation.   Visual acuity: normal  Right visual field deficit: none  Left visual field deficit:  none     CN III, IV, VI   Pupils are equal, round, and reactive to light.  Extraocular motions are normal.   Right pupil: Size: 3 mm. Shape: regular. Reactivity: brisk. Consensual response: intact. Accommodation: intact.   Left pupil: Size: 3 mm. Shape: regular. Reactivity: brisk. Consensual response: intact. Accommodation: intact.   CN III: no CN III palsy  CN VI: no CN VI palsy  Nystagmus: none   Diplopia: none  Upgaze: normal  Downgaze: normal  Conjugate gaze: present  Vestibulo-ocular reflex: present    CN V   Facial sensation intact.   Right facial sensation deficit: none  Left facial sensation deficit: none  Right corneal reflex: normal  Left corneal reflex: normal    CN VII   Facial expression full, symmetric.   Right facial weakness: none  Left facial weakness: none  Right taste: normal  Left taste: normal    CN VIII   CN VIII normal.   Hearing: intact    CN IX, X   CN IX normal.   CN X normal.   Palate: symmetric    CN XI   CN XI normal.   Right sternocleidomastoid strength: normal  Left sternocleidomastoid strength: normal  Right trapezius strength: normal  Left trapezius strength: normal    CN XII   CN XII normal.   Tongue: not atrophic  Fasciculations: absent  Tongue deviation: none    MOTOR EXAM   Muscle bulk: normal  Overall muscle tone: normal  Right arm tone: normal  Left arm tone: normal  Right arm pronator drift: absent  Left arm pronator drift: absent  Right leg tone: normal  Left leg tone: normal    Strength   Right neck flexion: 5/5  Left neck flexion: 5/5  Right neck extension: 5/5  Left neck extension: 5/5  Right deltoid: 5/5  Left deltoid: 5/5  Right biceps: 5/5  Left biceps: 5/5  Right triceps: 5/5  Left triceps: 5/5  Right wrist flexion: 5/5  Left wrist flexion: 5/5  Right wrist extension: 5/5  Left wrist extension: 5/5  Right interossei: 5/5  Left interossei: 5/5  Right iliopsoas: 5/5  Left iliopsoas: 5/5  Right quadriceps: 5/5  Left quadriceps: 5/5  Right hamstrin/5  Left hamstring:  5/5  Right anterior tibial: 5/5  Left anterior tibial: 5/5  Right posterior tibial: 5/5  Left posterior tibial: 5/5  Right gastroc: 5/5  Left gastroc: 5/5    REFLEXES     Reflexes   Right brachioradialis: 2+  Left brachioradialis: 2+  Right biceps: 2+  Left biceps: 2+  Right triceps: 2+  Left triceps: 2+  Right patellar: 2+  Left patellar: 2+  Right achilles: 2+  Left achilles: 2+  Right plantar: normal  Left plantar: normal  Right Stark: absent  Left Stark: absent  Right ankle clonus: absent  Left ankle clonus: absent  Right pendular knee jerk: absent  Left pendular knee jerk: absent    SENSORY EXAM   Light touch normal.   Right arm light touch: normal  Left arm light touch: normal  Right leg light touch: normal  Left leg light touch: normal  Vibration normal.   Right arm vibration: normal  Left arm vibration: normal  Right leg vibration: normal  Left leg vibration: normal  Proprioception normal.   Right arm proprioception: normal  Left arm proprioception: normal  Right leg proprioception: normal  Left leg proprioception: normal  Pinprick normal.   Right arm pinprick: normal  Left arm pinprick: normal  Right leg pinprick: normal  Left leg pinprick: normal  Graphesthesia: normal  Romberg: negative  Stereognosis: normal    GAIT AND COORDINATION     Gait  Gait: wide-based     Coordination   Finger to nose coordination: normal  Heel to shin coordination: normal  Tandem walking coordination: abnormal    Tremor   Resting tremor: absent  Intention tremor: absent  Action tremor: absent       Using cane to assist with ambulation        Physical Exam  Vitals and nursing note reviewed.   Constitutional:       Appearance: Normal appearance.   HENT:      Head: Normocephalic.   Eyes:      Extraocular Movements: EOM normal.      Pupils: Pupils are equal, round, and reactive to light.   Cardiovascular:      Rate and Rhythm: Normal rate and regular rhythm.      Pulses: Normal pulses.      Heart sounds: Normal heart sounds.    Pulmonary:      Effort: Pulmonary effort is normal.      Breath sounds: Normal breath sounds.   Musculoskeletal:      Cervical back: Normal, normal range of motion and neck supple.      Thoracic back: Tenderness and bony tenderness present. No swelling, edema, deformity, signs of trauma, lacerations or spasms. Decreased range of motion. No scoliosis.      Lumbar back: Normal.   Skin:     General: Skin is warm and dry.   Neurological:      General: No focal deficit present.      Mental Status: He is alert and oriented to person, place, and time.      Cranial Nerves: No cranial nerve deficit.      Sensory: No sensory deficit.      Motor: No weakness.      Coordination: Coordination normal. Finger-Nose-Finger Test, Heel to Shin Test and Romberg Test normal.      Gait: Tandem walk abnormal. Gait normal.      Deep Tendon Reflexes: Reflexes normal.      Reflex Scores:       Tricep reflexes are 2+ on the right side and 2+ on the left side.       Bicep reflexes are 2+ on the right side and 2+ on the left side.       Brachioradialis reflexes are 2+ on the right side and 2+ on the left side.       Patellar reflexes are 2+ on the right side and 2+ on the left side.       Achilles reflexes are 2+ on the right side and 2+ on the left side.  Psychiatric:         Mood and Affect: Mood normal.         Speech: Speech normal.         Behavior: Behavior normal.          Assessment:     Problem List Items Addressed This Visit          Neuro    Generalized epilepsy - Primary              Primary Diagnosis and ICD10  Generalized epilepsy [G40.309]    Plan:     Patient Instructions   Continue keppra 1500mg twice daily  No driving for 6 months per state law  Continue current topamax 50mg twice daily  Continue the singulair once daily    Seizure Precautions:  1. Take medications as directed  2. Avoid open flames and hot surfaces such as fires and grills  3. Avoid elevations such as ladders or stools  4. Avoid swimming alone  5. Make sure  getting plenty of sleep, recommend 7-8 hours per day  6. Avoid alcohol and illicit drugs  7. No driving or operating heavy machinery for 6 months after last known seizure     There are no discontinued medications.      Requested Prescriptions      No prescriptions requested or ordered in this encounter       No orders of the defined types were placed in this encounter.

## 2024-10-09 ENCOUNTER — OFFICE VISIT (OUTPATIENT)
Dept: FAMILY MEDICINE | Facility: CLINIC | Age: 29
End: 2024-10-09
Payer: COMMERCIAL

## 2024-10-09 VITALS
DIASTOLIC BLOOD PRESSURE: 81 MMHG | HEART RATE: 91 BPM | BODY MASS INDEX: 39.17 KG/M2 | WEIGHT: 315 LBS | SYSTOLIC BLOOD PRESSURE: 122 MMHG | HEIGHT: 75 IN

## 2024-10-09 DIAGNOSIS — R06.2 WHEEZING: ICD-10-CM

## 2024-10-09 DIAGNOSIS — R05.1 ACUTE COUGH: Primary | ICD-10-CM

## 2024-10-09 PROCEDURE — 96372 THER/PROPH/DIAG INJ SC/IM: CPT | Mod: ,,, | Performed by: FAMILY MEDICINE

## 2024-10-09 PROCEDURE — 3008F BODY MASS INDEX DOCD: CPT | Mod: CPTII,,, | Performed by: FAMILY MEDICINE

## 2024-10-09 PROCEDURE — 99214 OFFICE O/P EST MOD 30 MIN: CPT | Mod: 25,,, | Performed by: FAMILY MEDICINE

## 2024-10-09 PROCEDURE — 3079F DIAST BP 80-89 MM HG: CPT | Mod: CPTII,,, | Performed by: FAMILY MEDICINE

## 2024-10-09 PROCEDURE — 1159F MED LIST DOCD IN RCRD: CPT | Mod: CPTII,,, | Performed by: FAMILY MEDICINE

## 2024-10-09 PROCEDURE — 3044F HG A1C LEVEL LT 7.0%: CPT | Mod: CPTII,,, | Performed by: FAMILY MEDICINE

## 2024-10-09 PROCEDURE — 3074F SYST BP LT 130 MM HG: CPT | Mod: CPTII,,, | Performed by: FAMILY MEDICINE

## 2024-10-09 RX ORDER — DEXAMETHASONE SODIUM PHOSPHATE 4 MG/ML
4 INJECTION, SOLUTION INTRA-ARTICULAR; INTRALESIONAL; INTRAMUSCULAR; INTRAVENOUS; SOFT TISSUE
Status: COMPLETED | OUTPATIENT
Start: 2024-10-09 | End: 2024-10-09

## 2024-10-09 RX ORDER — CODEINE PHOSPHATE AND GUAIFENESIN 10; 100 MG/5ML; MG/5ML
5 SOLUTION ORAL 3 TIMES DAILY PRN
Qty: 118 ML | Refills: 0 | Status: SHIPPED | OUTPATIENT
Start: 2024-10-09 | End: 2024-10-19

## 2024-10-09 RX ADMIN — DEXAMETHASONE SODIUM PHOSPHATE 4 MG: 4 INJECTION, SOLUTION INTRA-ARTICULAR; INTRALESIONAL; INTRAMUSCULAR; INTRAVENOUS; SOFT TISSUE at 08:10

## 2024-10-09 NOTE — PROGRESS NOTES
"              Onur Currie IV, DO  [facility]  [address] [place] [address]  Phone: [phone]      Subjective     Name: Rito Bowens   Sex: male  YOB: 1995 (28 y.o.)  MRN: [id number]  Visit Date: 10/09/2024   Chief Complaint: Cough and Sinus Problem (Sick for over a week)        HISTORY OF PRESENT ILLNESS:    Chief Complaint   Patient presents with    Cough    Sinus Problem     Sick for over a week       Sore Throat   This is a new problem. The current episode started in the past 7 days. The problem has been gradually improving. Neither side of throat is experiencing more pain than the other. There has been no fever. The pain is at a severity of 3/10. The pain is mild. Associated symptoms include congestion, coughing, headaches and a hoarse voice. Pertinent negatives include no abdominal pain, diarrhea, drooling, ear discharge, ear pain, plugged ear sensation, neck pain, shortness of breath, stridor, swollen glands, trouble swallowing or vomiting. He has tried cool liquids and gargles for the symptoms. The treatment provided moderate relief.   See tests that keep you healthy and the problem oriented documentation below.    All of your core healthy metrics are met.      Portions of this note may have been created with voice recognition software. Occasional "wrong-word" or "sound-a-like" substitutions may have occurred due to the inherent limitations of voice recognition software. Please, read the note carefully and recognize, using context, where substitutions have occurred.     PAST MEDICAL HISTORY:  Significant Diagnoses - Patient  has a past medical history of Attention deficit hyperactivity disorder, Bilateral low back pain (08/24/2022), Epilepsy, unspecified, intractable, without status epilepticus, Headache, Juvenile myoclonic epilepsy, Migraine headache, and Right renal stone (08/16/2022).  Medications - Patient has a current medication list which " includes the following long-term medication(s): citalopram, levetiracetam, meclizine, and topiramate.   Allergies - Patient is allergic to amoxicillin and tamiflu [oseltamivir].  Surgeries - Patient  has no past surgical history on file.  Family History - Patient family history includes Cancer in an other family member; Diabetes in an other family member; Heart disease in an other family member; Hypertension in an other family member.      SOCIAL HISTORY:  Tobacco - Patient  reports that he has quit smoking. His smoking use included cigarettes. He has been exposed to tobacco smoke. His smokeless tobacco use includes snuff.   Alcohol - Patient  reports that he does not currently use alcohol.   Recreational Drugs - Patient  reports no history of drug use.       Review of Systems   HENT:  Positive for nasal congestion, hoarse voice and sore throat. Negative for drooling, ear discharge, ear pain and trouble swallowing.    Respiratory:  Positive for cough. Negative for shortness of breath and stridor.    Gastrointestinal:  Negative for abdominal pain, diarrhea and vomiting.   Musculoskeletal:  Negative for neck pain.   Neurological:  Positive for headaches.   All other systems reviewed and are negative.       Past Medical History:   Diagnosis Date    Attention deficit hyperactivity disorder     Bilateral low back pain 08/24/2022    Epilepsy, unspecified, intractable, without status epilepticus     Headache     Juvenile myoclonic epilepsy     Migraine headache     Right renal stone 08/16/2022    KUB - unable to identify stone presence.  Will need CT if he becomes symptomatic.  Did not strain urine at work, but does not thing stone passed. Last significant renal colic 8/11, 0-9/10 on pain scale        Review of patient's allergies indicates:   Allergen Reactions    Amoxicillin Other (See Comments)    Tamiflu [oseltamivir] Other (See Comments)     Reports seizures        No past surgical history on file.     Family  "History   Problem Relation Name Age of Onset    Heart disease Other      Hypertension Other      Diabetes Other      Cancer Other          type unknown       Current Outpatient Medications   Medication Instructions    citalopram (CELEXA) 20 mg, Oral, Daily    guaiFENesin-codeine 100-10 mg/5 ml (TUSSI-ORGANIDIN NR)  mg/5 mL syrup 5 mLs, Oral, 3 times daily PRN    levETIRAcetam (KEPPRA) 1,500 mg, Oral, 2 times daily    meclizine (ANTIVERT) 25 mg, Oral, 3 times daily PRN    meloxicam (MOBIC) 15 mg, Oral, Daily    montelukast (SINGULAIR) 10 mg, Oral, Nightly    topiramate (TOPAMAX) 50 mg, Oral, 2 times daily        Objective     /81   Pulse 91   Ht 6' 3" (1.905 m)   Wt (!) 155.6 kg (343 lb)   BMI 42.87 kg/m²     Physical Exam  Constitutional:       General: He is not in acute distress.     Appearance: Normal appearance. He is not ill-appearing.   HENT:      Head: Normocephalic and atraumatic.      Right Ear: External ear normal.      Left Ear: External ear normal.      Nose: Nose normal.   Eyes:      Extraocular Movements: Extraocular movements intact.   Cardiovascular:      Rate and Rhythm: Normal rate.   Pulmonary:      Effort: Pulmonary effort is normal.   Abdominal:      Palpations: Abdomen is soft.   Musculoskeletal:      Cervical back: Normal range of motion. No tenderness.   Neurological:      Mental Status: He is alert.   Psychiatric:         Mood and Affect: Mood normal.         Behavior: Behavior normal.        All recently obtained labs have been reviewed and discussed in detail with the patient.   Assessment     1. Acute cough    2. Wheezing         Plan        Problem List Items Addressed This Visit    None  Visit Diagnoses       Acute cough    -  Primary    Relevant Medications    guaiFENesin-codeine 100-10 mg/5 ml (TUSSI-ORGANIDIN NR)  mg/5 mL syrup    Wheezing        Relevant Medications    dexAMETHasone injection 4 mg (Completed)            No follow-ups on " file.    Patient advised that is symptoms worsen, they should call or report directly to local emergency department.    Onur Currie,   [facility]

## 2025-02-03 DIAGNOSIS — J30.1 SEASONAL ALLERGIC RHINITIS DUE TO POLLEN: Chronic | ICD-10-CM

## 2025-02-03 DIAGNOSIS — Z87.09 HISTORY OF ASTHMA: ICD-10-CM

## 2025-02-03 DIAGNOSIS — R05.3 CHRONIC COUGH: Chronic | ICD-10-CM

## 2025-02-03 RX ORDER — MONTELUKAST SODIUM 10 MG/1
10 TABLET ORAL NIGHTLY
Qty: 90 TABLET | Refills: 1 | Status: SHIPPED | OUTPATIENT
Start: 2025-02-03

## 2025-03-17 ENCOUNTER — OFFICE VISIT (OUTPATIENT)
Dept: NEUROLOGY | Facility: CLINIC | Age: 30
End: 2025-03-17
Payer: COMMERCIAL

## 2025-03-17 VITALS
HEIGHT: 75 IN | RESPIRATION RATE: 18 BRPM | HEART RATE: 73 BPM | BODY MASS INDEX: 39.17 KG/M2 | OXYGEN SATURATION: 97 % | SYSTOLIC BLOOD PRESSURE: 118 MMHG | WEIGHT: 315 LBS | DIASTOLIC BLOOD PRESSURE: 72 MMHG

## 2025-03-17 DIAGNOSIS — F32.A DEPRESSION, UNSPECIFIED DEPRESSION TYPE: ICD-10-CM

## 2025-03-17 DIAGNOSIS — G40.309 GENERALIZED EPILEPSY: Primary | ICD-10-CM

## 2025-03-17 PROCEDURE — 99213 OFFICE O/P EST LOW 20 MIN: CPT | Mod: S$PBB,,, | Performed by: NURSE PRACTITIONER

## 2025-03-17 PROCEDURE — 1160F RVW MEDS BY RX/DR IN RCRD: CPT | Mod: CPTII,,, | Performed by: NURSE PRACTITIONER

## 2025-03-17 PROCEDURE — 99999 PR PBB SHADOW E&M-EST. PATIENT-LVL IV: CPT | Mod: PBBFAC,,, | Performed by: NURSE PRACTITIONER

## 2025-03-17 PROCEDURE — 3078F DIAST BP <80 MM HG: CPT | Mod: CPTII,,, | Performed by: NURSE PRACTITIONER

## 2025-03-17 PROCEDURE — 3008F BODY MASS INDEX DOCD: CPT | Mod: CPTII,,, | Performed by: NURSE PRACTITIONER

## 2025-03-17 PROCEDURE — 1159F MED LIST DOCD IN RCRD: CPT | Mod: CPTII,,, | Performed by: NURSE PRACTITIONER

## 2025-03-17 PROCEDURE — 99214 OFFICE O/P EST MOD 30 MIN: CPT | Mod: PBBFAC | Performed by: NURSE PRACTITIONER

## 2025-03-17 PROCEDURE — 3074F SYST BP LT 130 MM HG: CPT | Mod: CPTII,,, | Performed by: NURSE PRACTITIONER

## 2025-03-17 RX ORDER — CITALOPRAM 20 MG/1
20 TABLET, FILM COATED ORAL DAILY
Qty: 90 TABLET | Refills: 3 | Status: SHIPPED | OUTPATIENT
Start: 2025-03-17

## 2025-03-17 NOTE — PROGRESS NOTES
Subjective:       Patient ID: Rito oBwens is a 29 y.o. male     Chief Complaint:    Chief Complaint   Patient presents with    Follow-up    Seizures              Allergies:  Amoxicillin and Tamiflu [oseltamivir]    Current Medications:    Outpatient Encounter Medications as of 3/17/2025   Medication Sig Dispense Refill    levETIRAcetam (KEPPRA) 500 MG Tab Take 3 tablets (1,500 mg total) by mouth 2 (two) times daily. 180 tablet 11    meclizine (ANTIVERT) 25 mg tablet Take 1 tablet (25 mg total) by mouth 3 (three) times daily as needed for Dizziness. 21 tablet 0    meloxicam (MOBIC) 15 MG tablet Take 1 tablet (15 mg total) by mouth once daily. 30 tablet 3    montelukast (SINGULAIR) 10 mg tablet Take 1 tablet (10 mg total) by mouth every evening. 90 tablet 1    topiramate (TOPAMAX) 50 MG tablet Take 1 tablet (50 mg total) by mouth 2 (two) times daily. 60 tablet 11    [DISCONTINUED] citalopram (CELEXA) 20 MG tablet Take 1 tablet (20 mg total) by mouth once daily. 90 tablet 3    citalopram (CELEXA) 20 MG tablet Take 1 tablet (20 mg total) by mouth once daily. 90 tablet 3     No facility-administered encounter medications on file as of 3/17/2025.       History of Present Illness  28 y/o WM here today for f/u for primary generalized epilepsy.      Prior to recent medication changes his last seizure was in 2019.  He was treated with topamax 200mg Bid and had been on this treatment 18 months with no complications.  He however was diagnosed with x2 kidney stones in a 3 month span and thus topamax was not recommended any longer.  He was started on keppra 500mg bid and once up to speed on dosing, was slow tapered off the topamax.  About a week after complete removal from topamax he had a seizure.  Was seen in the ER, had CT head, cervical, and lumbar with no apparent injury noted.  Keppra was increased to 1000mg Bid.  We repeated EEG, noted negative, but prior EEG in 2015 showed generalized epilepsy.  He had  another seizure on 9/16/22, we then added back topamax 50mg bid and increased keppra to 1500mg bid.  He actually had a T12 fracture after the seizure in September of 2022 and had to wear TLSO brace.    They deny further seizures since last visit, and he feels he is tolerating current treatment without complication    Originally he was treated with depakote but continued with seizures and was changed to topamax.           Review of Systems  Review of Systems   Constitutional:  Negative for diaphoresis and fever.   HENT:  Negative for congestion, hearing loss and tinnitus.    Eyes:  Negative for blurred vision, double vision, photophobia, discharge and redness.   Respiratory:  Negative for cough and shortness of breath.    Cardiovascular:  Negative for chest pain.   Gastrointestinal:  Negative for abdominal pain, nausea and vomiting.   Musculoskeletal:  Positive for back pain. Negative for joint pain, myalgias and neck pain.   Skin:  Negative for itching and rash.   Neurological:  Positive for seizures. Negative for dizziness, tremors, sensory change, speech change, focal weakness, loss of consciousness, weakness and headaches.   Psychiatric/Behavioral:  Negative for depression, hallucinations and memory loss. The patient does not have insomnia.    All other systems reviewed and are negative.     Objective:     NEUROLOGICAL EXAMINATION:     MENTAL STATUS   Oriented to person, place, and time.   Registration: recalls 3 of 3 objects. Recall at 5 minutes: recalls 3 of 3 objects.   Attention: normal. Concentration: normal.   Speech: speech is normal   Level of consciousness: alert  Knowledge: good and consistent with education.   Normal comprehension.     CRANIAL NERVES     CN II   Visual fields full to confrontation.   Visual acuity: normal  Right visual field deficit: none  Left visual field deficit: none     CN III, IV, VI   Pupils are equal, round, and reactive to light.  Extraocular motions are normal.   Right pupil:  Size: 3 mm. Shape: regular. Reactivity: brisk. Consensual response: intact. Accommodation: intact.   Left pupil: Size: 3 mm. Shape: regular. Reactivity: brisk. Consensual response: intact. Accommodation: intact.   CN III: no CN III palsy  CN VI: no CN VI palsy  Nystagmus: none   Diplopia: none  Upgaze: normal  Downgaze: normal  Conjugate gaze: present  Vestibulo-ocular reflex: present    CN V   Facial sensation intact.   Right facial sensation deficit: none  Left facial sensation deficit: none  Right corneal reflex: normal  Left corneal reflex: normal    CN VII   Facial expression full, symmetric.   Right facial weakness: none  Left facial weakness: none  Right taste: normal  Left taste: normal    CN VIII   CN VIII normal.   Hearing: intact    CN IX, X   CN IX normal.   CN X normal.   Palate: symmetric    CN XI   CN XI normal.   Right sternocleidomastoid strength: normal  Left sternocleidomastoid strength: normal  Right trapezius strength: normal  Left trapezius strength: normal    CN XII   CN XII normal.   Tongue: not atrophic  Fasciculations: absent  Tongue deviation: none    MOTOR EXAM   Muscle bulk: normal  Overall muscle tone: normal  Right arm tone: normal  Left arm tone: normal  Right arm pronator drift: absent  Left arm pronator drift: absent  Right leg tone: normal  Left leg tone: normal    Strength   Right neck flexion: 5/5  Left neck flexion: 5/5  Right neck extension: 5/5  Left neck extension: 5/5  Right deltoid: 5/5  Left deltoid: 5/5  Right biceps: 5/5  Left biceps: 5/5  Right triceps: 5/5  Left triceps: 5/5  Right wrist flexion: 5/5  Left wrist flexion: 5/5  Right wrist extension: 5/5  Left wrist extension: 5/5  Right interossei: 5/5  Left interossei: 5/5  Right iliopsoas: 5/5  Left iliopsoas: 5/5  Right quadriceps: 5/5  Left quadriceps: 5/5  Right hamstrin/5  Left hamstrin/5  Right anterior tibial: 5/5  Left anterior tibial: 5/5  Right posterior tibial: 5/5  Left posterior tibial: 5/5  Right  gastroc: 5/5  Left gastroc: 5/5    REFLEXES     Reflexes   Right brachioradialis: 2+  Left brachioradialis: 2+  Right biceps: 2+  Left biceps: 2+  Right triceps: 2+  Left triceps: 2+  Right patellar: 2+  Left patellar: 2+  Right achilles: 2+  Left achilles: 2+  Right plantar: normal  Left plantar: normal  Right Stark: absent  Left Stark: absent  Right ankle clonus: absent  Left ankle clonus: absent  Right pendular knee jerk: absent  Left pendular knee jerk: absent    SENSORY EXAM   Light touch normal.   Right arm light touch: normal  Left arm light touch: normal  Right leg light touch: normal  Left leg light touch: normal  Vibration normal.   Right arm vibration: normal  Left arm vibration: normal  Right leg vibration: normal  Left leg vibration: normal  Proprioception normal.   Right arm proprioception: normal  Left arm proprioception: normal  Right leg proprioception: normal  Left leg proprioception: normal  Pinprick normal.   Right arm pinprick: normal  Left arm pinprick: normal  Right leg pinprick: normal  Left leg pinprick: normal  Graphesthesia: normal  Romberg: negative  Stereognosis: normal    GAIT AND COORDINATION     Gait  Gait: wide-based     Coordination   Finger to nose coordination: normal  Heel to shin coordination: normal  Tandem walking coordination: abnormal    Tremor   Resting tremor: absent  Intention tremor: absent  Action tremor: absent       Using cane to assist with ambulation        Physical Exam  Vitals and nursing note reviewed.   Constitutional:       Appearance: Normal appearance.   HENT:      Head: Normocephalic.   Eyes:      Extraocular Movements: EOM normal.      Pupils: Pupils are equal, round, and reactive to light.   Cardiovascular:      Rate and Rhythm: Normal rate and regular rhythm.      Pulses: Normal pulses.      Heart sounds: Normal heart sounds.   Pulmonary:      Effort: Pulmonary effort is normal.      Breath sounds: Normal breath sounds.   Musculoskeletal:       Cervical back: Normal, normal range of motion and neck supple.      Thoracic back: Tenderness and bony tenderness present. No swelling, edema, deformity, signs of trauma, lacerations or spasms. Decreased range of motion. No scoliosis.      Lumbar back: Normal.   Skin:     General: Skin is warm and dry.   Neurological:      General: No focal deficit present.      Mental Status: He is alert and oriented to person, place, and time.      Cranial Nerves: No cranial nerve deficit.      Sensory: No sensory deficit.      Motor: No weakness.      Coordination: Coordination normal. Finger-Nose-Finger Test, Heel to Shin Test and Romberg Test normal.      Gait: Tandem walk abnormal. Gait normal.      Deep Tendon Reflexes: Reflexes normal.      Reflex Scores:       Tricep reflexes are 2+ on the right side and 2+ on the left side.       Bicep reflexes are 2+ on the right side and 2+ on the left side.       Brachioradialis reflexes are 2+ on the right side and 2+ on the left side.       Patellar reflexes are 2+ on the right side and 2+ on the left side.       Achilles reflexes are 2+ on the right side and 2+ on the left side.  Psychiatric:         Mood and Affect: Mood normal.         Speech: Speech normal.         Behavior: Behavior normal.          Assessment:     Problem List Items Addressed This Visit          Neuro    Generalized epilepsy - Primary       Psychiatric    Depression    Relevant Medications    citalopram (CELEXA) 20 MG tablet                Primary Diagnosis and ICD10  Generalized epilepsy [G40.309]    Plan:     There are no Patient Instructions on file for this visit.    Medications Discontinued During This Encounter   Medication Reason    citalopram (CELEXA) 20 MG tablet Reorder         Requested Prescriptions     Signed Prescriptions Disp Refills    citalopram (CELEXA) 20 MG tablet 90 tablet 3     Sig: Take 1 tablet (20 mg total) by mouth once daily.       No orders of the defined types were placed in this  encounter.

## 2025-08-04 DIAGNOSIS — J30.1 SEASONAL ALLERGIC RHINITIS DUE TO POLLEN: Chronic | ICD-10-CM

## 2025-08-04 DIAGNOSIS — Z87.09 HISTORY OF ASTHMA: ICD-10-CM

## 2025-08-04 DIAGNOSIS — R05.3 CHRONIC COUGH: Chronic | ICD-10-CM

## 2025-08-05 RX ORDER — MONTELUKAST SODIUM 10 MG/1
10 TABLET ORAL NIGHTLY
Qty: 90 TABLET | Refills: 1 | Status: SHIPPED | OUTPATIENT
Start: 2025-08-05

## 2025-08-06 DIAGNOSIS — G40.309 GENERALIZED EPILEPSY: ICD-10-CM

## 2025-08-06 RX ORDER — TOPIRAMATE 50 MG/1
50 TABLET, FILM COATED ORAL 2 TIMES DAILY
Qty: 60 TABLET | Refills: 11 | Status: SHIPPED | OUTPATIENT
Start: 2025-08-06 | End: 2026-08-01

## 2025-08-15 DIAGNOSIS — G40.309 GENERALIZED EPILEPSY: ICD-10-CM

## 2025-08-15 RX ORDER — LEVETIRACETAM 500 MG/1
1500 TABLET ORAL 2 TIMES DAILY
Qty: 180 TABLET | Refills: 11 | Status: SHIPPED | OUTPATIENT
Start: 2025-08-15 | End: 2026-08-15